# Patient Record
Sex: MALE | Race: WHITE
[De-identification: names, ages, dates, MRNs, and addresses within clinical notes are randomized per-mention and may not be internally consistent; named-entity substitution may affect disease eponyms.]

---

## 2018-01-13 ENCOUNTER — HOSPITAL ENCOUNTER (EMERGENCY)
Dept: HOSPITAL 17 - NEPD | Age: 28
Discharge: HOME | End: 2018-01-13
Payer: SELF-PAY

## 2018-01-13 VITALS
RESPIRATION RATE: 18 BRPM | HEART RATE: 85 BPM | DIASTOLIC BLOOD PRESSURE: 65 MMHG | OXYGEN SATURATION: 98 % | SYSTOLIC BLOOD PRESSURE: 132 MMHG

## 2018-01-13 VITALS — DIASTOLIC BLOOD PRESSURE: 65 MMHG | SYSTOLIC BLOOD PRESSURE: 130 MMHG

## 2018-01-13 VITALS — TEMPERATURE: 98.8 F

## 2018-01-13 DIAGNOSIS — F17.200: ICD-10-CM

## 2018-01-13 DIAGNOSIS — J45.901: ICD-10-CM

## 2018-01-13 DIAGNOSIS — Z59.0: ICD-10-CM

## 2018-01-13 DIAGNOSIS — F31.9: ICD-10-CM

## 2018-01-13 DIAGNOSIS — J06.9: Primary | ICD-10-CM

## 2018-01-13 DIAGNOSIS — F90.9: ICD-10-CM

## 2018-01-13 PROCEDURE — 87804 INFLUENZA ASSAY W/OPTIC: CPT

## 2018-01-13 PROCEDURE — 99284 EMERGENCY DEPT VISIT MOD MDM: CPT

## 2018-01-13 PROCEDURE — 94664 DEMO&/EVAL PT USE INHALER: CPT

## 2018-01-13 SDOH — ECONOMIC STABILITY - HOUSING INSECURITY: HOMELESSNESS: Z59.0

## 2018-01-13 NOTE — PD
HPI


Chief Complaint:  Cold / Flu Symptoms


Time Seen by Provider:  08:19


Travel History


International Travel<30 days:  No


Contact w/Intl Traveler<30days:  No


Traveled to known affect area:  No





History of Present Illness


HPI


27-year-old male, with history of asthma, presents to the emergency Department 

with complaint of cough, nasal congestion, chest congestion, chest tightness, 

shortness of breath with worsening of symptoms 1 week.  Patient is homeless 

and was brought in via EMS.  Was seen at [facility] yesterday and said he 

had a chest x-ray done which he was told was okay.  He said he was discharged 

with Tessalon pearls.  Unknown fevers.  Denies abdominal pain, vomiting.  

Denies sore throat, ear pain.  Reports wheezing.  Denies chest pain.  Has been 

taking Delsym for symptom management.  Symptoms are mild in severity.  Symptoms 

are aggravated with walking.  No known relieving factors.  No known allergies.  

No primary care provider.  History of asthma.  Has not medical complaints.  No 

other modifying factors or associated signs and symptoms.





PFSH


Past Medical History


ADHD:  Yes


Asthma:  Yes


Bipolar Disorder:  Yes


Diminished Hearing:  No


Immunizations Current:  Yes


Influenza Vaccination:  No





Past Surgical History


Surgical History:  No Previous Surgery





Social History


Alcohol Use:  Yes (OCCASIONALLY)


Tobacco Use:  Yes (1 ppd)


Substance Use:  Yes ( herion iv, occ. cocaine; DENIES ON THIS VISIT)





Allergies-Medications


(Allergen,Severity, Reaction):  


Coded Allergies:  


     No Known Allergies (Verified  Adverse Reaction, Unknown, 1/13/18)


Reported Meds & Prescriptions





Reported Meds & Active Scripts


Active


Azithromycin 500 Mg Tab 500 Mg PO DAILY


Deltasone (Prednisone) 20 Mg Tab 20 Mg PO BID








Review of Systems


Except as stated in HPI:  all other systems reviewed are Neg





Physical Exam


Narrative


GENERAL: Well-nourished, well-developed  male patient, in no acute 

distress; afebrile, nontoxic-appearing


SKIN: Warm and dry.


HEAD: Atraumatic. Normocephalic. 


EYES: Pupils equal and round. No scleral icterus. No injection or drainage. 


ENT: Mucosa pink and moist. No erythema or exudates. No uvular edema. No uvular

, palatal, or tonsillar deviation.  Airway patent.  Nares without nasal blood, 

purulent drainage or septal hematoma.


EARS: Bilateral pinnae and external canals appear within normal limits. 

Bilateral tympanic membranes without erythema, dullness or perforation.


NECK: Trachea midline.  No lymphadenopathy. 


CARDIOVASCULAR: Regular rate and rhythm.  No murmur appreciated.


RESPIRATORY: No accessory muscle use.  Lungs with Wheezing throughout to 

auscultation. Breath sounds equal bilaterally.  No retractions or tachypnea.  

No Audible wheezing noted.  


GASTROINTESTINAL: Flat.


MUSCULOSKELETAL: No obvious deformities. No clubbing.  No cyanosis.  No edema. 


NEUROLOGICAL: Awake and alert.  Oriented 3.  No obvious cranial nerve 

deficits.  Motor grossly within normal limits. Normal speech. Moves all 

extremities.  5/5 strength to all extremities.


PSYCHIATRIC: Appropriate mood and affect; insight and judgment normal.





Data


Data


Last Documented VS





Vital Signs








  Date Time  Temp Pulse Resp B/P (MAP) Pulse Ox O2 Delivery O2 Flow Rate FiO2


 


1/13/18 08:23  85 18 132/65 (87) 98   


 


1/13/18 06:48 98.8       








Orders





 Orders


Influenzae A/B Antigen (1/13/18 07:03)


Prednisone (Deltasone) (1/13/18 08:30)


Albuterol-Ipratropium Neb (Duoneb Neb) (1/13/18 08:30)


Albuterol Hfa Inh (Proair Hfa Inh) (1/13/18 08:30)








MDM


Medical Decision Making


Medical Screen Exam Complete:  Yes


Emergency Medical Condition:  Yes


Medical Record Reviewed:  Yes


Differential Diagnosis


Acute bronchitis, upper respiratory infection, influenza, pneumonia


Narrative Course


27-year-old male physical examination consistent with asthma exacerbation and 

upper respiratory infection.  Patient has been sick for approximately one week.

  Lungs with wheezing throughout.  Patient is in no acute distress without 

retractions or tachypnea.  Oxygen saturation is 98% on room air.  He states he 

was seen at [facility] yesterday and had a chest x-ray which was normal, 

per the patient.  DuoNeb and Deltasone ordered.  


0847:  On reexamination lungs are clear and equal throughout.  The patient 

reports improvement in symptoms.  He denies chest tightness or shortness of 

breath.  Patient is homeless.  I will provide the patient with an albuterol 

inhaler for home.  Prescribed azithromycin, Deltasone for home.  Instructed 

patient to follow up with primary care provider.  Patient verbalizes 

understanding and agreement with treatment plan.  Patient is medically cleared 

and stable for discharge.  Discussed reasons to return to the emergency 

department.  Patient agrees with treatment plan.  The patients vital signs are 

stable and the patient is stable for outpatient follow-up and treatment.  

Patient discharged home, stable and in no acute distress.





Diagnosis





 Primary Impression:  


 URI (upper respiratory infection)


 Qualified Codes:  J06.9 - Acute upper respiratory infection, unspecified


 Additional Impression:  


 Asthma exacerbation


 Qualified Codes:  J45.901 - Unspecified asthma with (acute) exacerbation


Referrals:  


[facility]





Primary Care Physician


Patient Instructions:  Acute Bronchitis (ED), Asthma (ED), General Instructions

, Upper Respiratory Infection (ED)





***Additional Instructions:  


Use Albuterol inhaler as prescribed


Take oral steroids as prescribed and complete full course


Use Tessalon Perles as prescribed to decrease coughing spasms


Over-the-counter decongestants or antihistamines as directed and as needed for 

symptom management


Your cough can last 4-6 weeks


Drink plenty of fluids to prevent dehydration


Use hot air humidifier to decrease cough exacerbation


Turn off ceiling fans and sleep with head of bed elevated


Avoid triggers such as second hand smoke, dust, known allergens


Follow-up with your primary care provider


Return to the emergency department immediately with worsening of symptoms


***Med/Other Pt SpecificInfo:  Prescription(s) given


Scripts


Azithromycin (Azithromycin) 500 Mg Tab


500 MG PO DAILY for Infection, #5 TAB 0 Refills


   Prov: Preeti Tidwell         1/13/18 


Prednisone (Deltasone) 20 Mg Tab


20 MG PO BID, #60 TAB 0 Refills


   Prov: Preeti Tidwell         1/13/18


Disposition:  01 DISCHARGE HOME


Condition:  Stable











Preeti Tidwell Jan 13, 2018 08:30

## 2018-01-25 ENCOUNTER — HOSPITAL ENCOUNTER (INPATIENT)
Dept: HOSPITAL 17 - NEPD | Age: 28
LOS: 4 days | Discharge: HOME | DRG: 872 | End: 2018-01-29
Attending: HOSPITALIST | Admitting: HOSPITALIST
Payer: SELF-PAY

## 2018-01-25 VITALS — HEIGHT: 72 IN | BODY MASS INDEX: 25.68 KG/M2 | WEIGHT: 189.6 LBS

## 2018-01-25 VITALS
OXYGEN SATURATION: 99 % | TEMPERATURE: 98.4 F | DIASTOLIC BLOOD PRESSURE: 84 MMHG | RESPIRATION RATE: 24 BRPM | SYSTOLIC BLOOD PRESSURE: 162 MMHG | HEART RATE: 92 BPM

## 2018-01-25 VITALS — RESPIRATION RATE: 20 BRPM | OXYGEN SATURATION: 100 %

## 2018-01-25 DIAGNOSIS — F11.10: ICD-10-CM

## 2018-01-25 DIAGNOSIS — M54.5: ICD-10-CM

## 2018-01-25 DIAGNOSIS — E87.6: ICD-10-CM

## 2018-01-25 DIAGNOSIS — F17.210: ICD-10-CM

## 2018-01-25 DIAGNOSIS — B19.20: ICD-10-CM

## 2018-01-25 DIAGNOSIS — F31.9: ICD-10-CM

## 2018-01-25 DIAGNOSIS — R19.7: ICD-10-CM

## 2018-01-25 DIAGNOSIS — R74.0: ICD-10-CM

## 2018-01-25 DIAGNOSIS — D64.9: ICD-10-CM

## 2018-01-25 DIAGNOSIS — J45.909: ICD-10-CM

## 2018-01-25 DIAGNOSIS — A41.9: Primary | ICD-10-CM

## 2018-01-25 LAB
ALBUMIN SERPL-MCNC: 3.2 GM/DL (ref 3.4–5)
ALP SERPL-CCNC: 133 U/L (ref 45–117)
ALT SERPL-CCNC: 163 U/L (ref 12–78)
AST SERPL-CCNC: 277 U/L (ref 15–37)
BASOPHILS # BLD AUTO: 0 TH/MM3 (ref 0–0.2)
BASOPHILS NFR BLD: 0.2 % (ref 0–2)
BILIRUB SERPL-MCNC: 0.7 MG/DL (ref 0.2–1)
BUN SERPL-MCNC: 12 MG/DL (ref 7–18)
CALCIUM SERPL-MCNC: 8.6 MG/DL (ref 8.5–10.1)
CHLORIDE SERPL-SCNC: 101 MEQ/L (ref 98–107)
CREAT SERPL-MCNC: 0.9 MG/DL (ref 0.6–1.3)
EOSINOPHIL # BLD: 0.1 TH/MM3 (ref 0–0.4)
EOSINOPHIL NFR BLD: 0.3 % (ref 0–4)
ERYTHROCYTE [DISTWIDTH] IN BLOOD BY AUTOMATED COUNT: 13.3 % (ref 11.6–17.2)
GFR SERPLBLD BASED ON 1.73 SQ M-ARVRAT: 102 ML/MIN (ref 89–?)
GLUCOSE SERPL-MCNC: 93 MG/DL (ref 74–106)
HCO3 BLD-SCNC: 26.1 MEQ/L (ref 21–32)
HCT VFR BLD CALC: 40 % (ref 39–51)
HGB BLD-MCNC: 13.6 GM/DL (ref 13–17)
LACTIC ACID SEPSIS PROTOCOL: 2.2 MMOL/L (ref 0.4–2)
LIPASE: 78 U/L (ref 73–393)
LYMPHOCYTES # BLD AUTO: 0.5 TH/MM3 (ref 1–4.8)
LYMPHOCYTES NFR BLD AUTO: 2.3 % (ref 9–44)
MCH RBC QN AUTO: 28.2 PG (ref 27–34)
MCHC RBC AUTO-ENTMCNC: 33.9 % (ref 32–36)
MCV RBC AUTO: 83.1 FL (ref 80–100)
MONOCYTE #: 0.1 TH/MM3 (ref 0–0.9)
MONOCYTES NFR BLD: 0.5 % (ref 0–8)
NEUTROPHILS # BLD AUTO: 20.5 TH/MM3 (ref 1.8–7.7)
NEUTROPHILS NFR BLD AUTO: 96.7 % (ref 16–70)
PLATELET # BLD: 261 TH/MM3 (ref 150–450)
PMV BLD AUTO: 8.4 FL (ref 7–11)
PROT SERPL-MCNC: 7.3 GM/DL (ref 6.4–8.2)
RBC # BLD AUTO: 4.82 MIL/MM3 (ref 4.5–5.9)
SODIUM SERPL-SCNC: 136 MEQ/L (ref 136–145)
WBC # BLD AUTO: 21.2 TH/MM3 (ref 4–11)

## 2018-01-25 PROCEDURE — 80074 ACUTE HEPATITIS PANEL: CPT

## 2018-01-25 PROCEDURE — 83520 IMMUNOASSAY QUANT NOS NONAB: CPT

## 2018-01-25 PROCEDURE — 83735 ASSAY OF MAGNESIUM: CPT

## 2018-01-25 PROCEDURE — 72158 MRI LUMBAR SPINE W/O & W/DYE: CPT

## 2018-01-25 PROCEDURE — 82550 ASSAY OF CK (CPK): CPT

## 2018-01-25 PROCEDURE — 71275 CT ANGIOGRAPHY CHEST: CPT

## 2018-01-25 PROCEDURE — 83550 IRON BINDING TEST: CPT

## 2018-01-25 PROCEDURE — 74177 CT ABD & PELVIS W/CONTRAST: CPT

## 2018-01-25 PROCEDURE — 83540 ASSAY OF IRON: CPT

## 2018-01-25 PROCEDURE — 80048 BASIC METABOLIC PNL TOTAL CA: CPT

## 2018-01-25 PROCEDURE — 86403 PARTICLE AGGLUT ANTBDY SCRN: CPT

## 2018-01-25 PROCEDURE — 96361 HYDRATE IV INFUSION ADD-ON: CPT

## 2018-01-25 PROCEDURE — 83690 ASSAY OF LIPASE: CPT

## 2018-01-25 PROCEDURE — 76705 ECHO EXAM OF ABDOMEN: CPT

## 2018-01-25 PROCEDURE — 93005 ELECTROCARDIOGRAM TRACING: CPT

## 2018-01-25 PROCEDURE — 87506 IADNA-DNA/RNA PROBE TQ 6-11: CPT

## 2018-01-25 PROCEDURE — A9537 TC99M MEBROFENIN: HCPCS

## 2018-01-25 PROCEDURE — 82103 ALPHA-1-ANTITRYPSIN TOTAL: CPT

## 2018-01-25 PROCEDURE — 86803 HEPATITIS C AB TEST: CPT

## 2018-01-25 PROCEDURE — 85610 PROTHROMBIN TIME: CPT

## 2018-01-25 PROCEDURE — 85027 COMPLETE CBC AUTOMATED: CPT

## 2018-01-25 PROCEDURE — 85025 COMPLETE CBC W/AUTO DIFF WBC: CPT

## 2018-01-25 PROCEDURE — 87185 SC STD ENZYME DETCJ PER NZM: CPT

## 2018-01-25 PROCEDURE — 87902 NFCT AGT GNTYP ALYS HEP C: CPT

## 2018-01-25 PROCEDURE — 93306 TTE W/DOPPLER COMPLETE: CPT

## 2018-01-25 PROCEDURE — 82390 ASSAY OF CERULOPLASMIN: CPT

## 2018-01-25 PROCEDURE — 87205 SMEAR GRAM STAIN: CPT

## 2018-01-25 PROCEDURE — 86038 ANTINUCLEAR ANTIBODIES: CPT

## 2018-01-25 PROCEDURE — 81001 URINALYSIS AUTO W/SCOPE: CPT

## 2018-01-25 PROCEDURE — 80053 COMPREHEN METABOLIC PANEL: CPT

## 2018-01-25 PROCEDURE — 87493 C DIFF AMPLIFIED PROBE: CPT

## 2018-01-25 PROCEDURE — 87522 HEPATITIS C REVRS TRNSCRPJ: CPT

## 2018-01-25 PROCEDURE — 83880 ASSAY OF NATRIURETIC PEPTIDE: CPT

## 2018-01-25 PROCEDURE — 96375 TX/PRO/DX INJ NEW DRUG ADDON: CPT

## 2018-01-25 PROCEDURE — 87804 INFLUENZA ASSAY W/OPTIC: CPT

## 2018-01-25 PROCEDURE — 85379 FIBRIN DEGRADATION QUANT: CPT

## 2018-01-25 PROCEDURE — 85007 BL SMEAR W/DIFF WBC COUNT: CPT

## 2018-01-25 PROCEDURE — 80076 HEPATIC FUNCTION PANEL: CPT

## 2018-01-25 PROCEDURE — 96366 THER/PROPH/DIAG IV INF ADDON: CPT

## 2018-01-25 PROCEDURE — 87328 CRYPTOSPORIDIUM AG IA: CPT

## 2018-01-25 PROCEDURE — 83605 ASSAY OF LACTIC ACID: CPT

## 2018-01-25 PROCEDURE — 82728 ASSAY OF FERRITIN: CPT

## 2018-01-25 PROCEDURE — A9579 GAD-BASE MR CONTRAST NOS,1ML: HCPCS

## 2018-01-25 PROCEDURE — 96365 THER/PROPH/DIAG IV INF INIT: CPT

## 2018-01-25 PROCEDURE — 87040 BLOOD CULTURE FOR BACTERIA: CPT

## 2018-01-25 PROCEDURE — 78227 HEPATOBIL SYST IMAGE W/DRUG: CPT

## 2018-01-25 PROCEDURE — 82105 ALPHA-FETOPROTEIN SERUM: CPT

## 2018-01-25 PROCEDURE — 86255 FLUORESCENT ANTIBODY SCREEN: CPT

## 2018-01-25 PROCEDURE — 87329 GIARDIA AG IA: CPT

## 2018-01-25 PROCEDURE — 80202 ASSAY OF VANCOMYCIN: CPT

## 2018-01-25 RX ADMIN — PHENYTOIN SODIUM SCH MLS/HR: 50 INJECTION INTRAMUSCULAR; INTRAVENOUS at 23:40

## 2018-01-25 NOTE — RADRPT
EXAM DATE/TIME:  01/25/2018 21:34 

 

[facility] COMPARISON:     

No previous studies available for comparison.

 

 

INDICATIONS :     

Bilateral flank pain. 

                      

 

IV CONTRAST:     

100 cc Omnipaque 350 (iohexol) IV 

 

 

ORAL CONTRAST:      

No oral contrast ingested.

                      

 

RADIATION DOSE:     

6.64 CTDIvol (mGy) 

 

 

MEDICAL HISTORY :       

Substance abuse. 

 

SURGICAL HISTORY :      

None. 

 

ENCOUNTER:      

Initial

 

ACUITY:      

1 day

 

PAIN SCALE:      

7/10

 

LOCATION:       

Bilateral flank 

 

TECHNIQUE:     

Volumetric scanning of the abdomen and pelvis was performed.  Using automated exposure control and ad
justment of the mA and/or kV according to patient size, radiation dose was kept as low as reasonably 
achievable to obtain optimal diagnostic quality images.  DICOM format image data is available electro
nically for review and comparison.  

 

FINDINGS:     

 

LOWER LUNGS:     

Mild patchy infiltrate in the visualized right middle and lower lobes

 

LIVER:     

Enlarged at 24 cm craniocaudal. No focal hepatic lesion.  There is no dilation of the biliary tree. S
ome mild periportal edema is present, nonspecific but often seen in the setting of vigorous intraveno
us hydration; underlying hepatocellular disease also in the differential. There is small pericholecys
tic fluid, series 2 image 35, possibly on the same basis. I don't clearly see gallbladder wall thicke
sonya or inflammatory change. No perceptible stones.

 

SPLEEN:     

Upper limits of normal size, 9.2 x 14.2 x 12.1 cm

 

PANCREAS:     

Within normal limits.

 

KIDNEYS:     

Normal in size and shape.  There is no mass, stone or hydronephrosis.

 

ADRENAL GLANDS:     

Within normal limits.

 

VASCULAR:     

There is no aortic aneurysm.

 

BOWEL/MESENTERY:     

The stomach, small bowel, and colon demonstrate no acute abnormality.  There is no free intraperitone
al air or fluid. Normal appendix.

 

ABDOMINAL WALL:     

Small amount of fat herniated at the umbilicus. No bowel herniation.

 

RETROPERITONEUM:     

There is no lymphadenopathy. 

 

BLADDER:     

No wall thickening or mass. 

 

REPRODUCTIVE:     

Within normal limits.

 

INGUINAL:     

There is no lymphadenopathy or hernia. 

 

MUSCULOSKELETAL:     

Within normal limits for patient age. 

 

CONCLUSION:     

1. No evidence of bilateral Friday as are other acute renal abnormality.

2. Hepatomegaly with periportal edema and also trace fluid around the gallbladder. Please see above. 
No perceptible inflammatory changes.

3. Upper limits of normal to mildly enlarged spleen, nonspecific.

4. Apparent patchy pneumonia in the visualized right base.

5. Small fat containing umbilical hernia.

 

 

 

 Nahid Cueva MD on January 25, 2018 at 21:48           

Board Certified Radiologist.

 This report was verified electronically.

## 2018-01-25 NOTE — PD
HPI


Chief Complaint:  Headache


Time Seen by Provider:  20:01


Travel History


International Travel<30 days:  No


Contact w/Intl Traveler<30days:  No


Traveled to known affect area:  No





History of Present Illness


HPI


The patient is 27 years old and arrives to the ER with a complaint low back 

pain.  Duration about 2 hours.  He is currently an IV drug abuser, heroin.  He 

states the pain is severe.  He believes his "kidneys are exploding."  He denies 

fever.  The pain is constant.  It's worse with palpation/percussion of the low 

back.





PFSH


Past Medical History


ADHD:  Yes


Asthma:  Yes


Bipolar Disorder:  Yes


Diminished Hearing:  No


Immunizations Current:  Yes





Past Surgical History


Surgical History:  No Previous Surgery





Social History


Alcohol Use:  Yes (OCCASIONALLY)


Tobacco Use:  Yes (1 ppd)


Substance Use:  Yes ( herion iv, occ. cocaine; DENIES ON THIS VISIT)





Allergies-Medications


(Allergen,Severity, Reaction):  


Coded Allergies:  


     No Known Allergies (Verified  Adverse Reaction, Unknown, 1/25/18)


Reported Meds & Prescriptions





Reported Meds & Active Scripts


Active


Azithromycin 500 Mg Tab 500 Mg PO DAILY


Deltasone (Prednisone) 20 Mg Tab 20 Mg PO BID








Review of Systems


Except as stated in HPI:  all other systems reviewed are Neg





Physical Exam


Narrative


GENERAL: 27-year-old male well-nourished well-developed mild to moderate 

distress


SKIN: Warm and dry.


HEAD: Atraumatic. Normocephalic. 


EYES: Pupils equal and round. No scleral icterus. No injection or drainage. 


ENT: No nasal bleeding or discharge.  Mucous membranes pink and moist.


NECK: Trachea midline. No JVD. 


CARDIOVASCULAR: Regular rate and rhythm.  


RESPIRATORY: No accessory muscle use. Clear to auscultation. Breath sounds 

equal bilaterally. 


GASTROINTESTINAL: Soft.  Tenderness to percussion lower back.  No abdominal TTP.


MUSCULOSKELETAL: Extremities without clubbing, cyanosis, or edema. No obvious 

deformities. 


NEUROLOGICAL: Awake and alert. No obvious cranial nerve deficits.  Motor 

grossly within normal limits. Five out of 5 muscle strength in the arms and 

legs.  Normal speech.


PSYCHIATRIC: Appropriate mood and affect; insight and judgment normal.





Data


Data


Last Documented VS





Vital Signs








  Date Time  Temp Pulse Resp B/P (MAP) Pulse Ox O2 Delivery O2 Flow Rate FiO2


 


1/25/18 20:09   20  100 Room Air  


 


1/25/18 18:14 98.4 92      





VS reviewed


Orders





 Orders


Complete Blood Count With Diff (1/25/18 20:01)


Comprehensive Metabolic Panel (1/25/18 20:01)


Lipase (1/25/18 20:01)


Urinalysis - C+S If Indicated (1/25/18 20:01)


Ct Abd/Pel W Iv Contrast(Rout) (1/25/18 20:01)


Iv Access Insert/Monitor (1/25/18 20:01)


Ecg Monitoring (1/25/18 20:01)


Oximetry (1/25/18 20:01)


Sodium Chlor 0.9% 1000 Ml Inj (Ns 1000 M (1/25/18 20:01)


Sodium Chloride 0.9% Flush (Ns Flush) (1/25/18 20:15)


Electrocardiogram (1/25/18 20:01)


Ketorolac Inj (Toradol Inj) (1/25/18 20:15)


Iohexol 350 Inj (Omnipaque 350 Inj) (1/25/18 21:36)


Piperacil-Tazo 4.5 Gm Premix (Zosyn 4.5 (1/25/18 22:15)


Vancomycin Inj (Vancomycin Inj) (1/25/18 22:15)


Blood Culture (1/25/18 22:04)


Admit Order (Ed Use Only) (1/25/18 )


Cardiac Monitor / Telemetry PAULO.Q8H (1/25/18 22:47)


Vital Signs (Adult) Q4H (1/25/18 22:47)


Diet Npo (1/26/18 Breakfast)


Activity Bed Rest (1/25/18 22:47)


Lactic Acid Sepsis Protocol (1/25/18 22:47)


Sodium Chlor 0.9% 1000 Ml Inj (Ns 1000 M (1/25/18 22:47)


Sodium Chlor 0.9% 1000 Ml Inj (Ns 1000 M (1/25/18 22:47)





Labs





Laboratory Tests








Test


  1/25/18


20:20


 


White Blood Count 21.2 TH/MM3 


 


Red Blood Count 4.82 MIL/MM3 


 


Hemoglobin 13.6 GM/DL 


 


Hematocrit 40.0 % 


 


Mean Corpuscular Volume 83.1 FL 


 


Mean Corpuscular Hemoglobin 28.2 PG 


 


Mean Corpuscular Hemoglobin


Concent 33.9 % 


 


 


Red Cell Distribution Width 13.3 % 


 


Platelet Count 261 TH/MM3 


 


Mean Platelet Volume 8.4 FL 


 


Neutrophils (%) (Auto) 96.7 % 


 


Lymphocytes (%) (Auto) 2.3 % 


 


Monocytes (%) (Auto) 0.5 % 


 


Eosinophils (%) (Auto) 0.3 % 


 


Basophils (%) (Auto) 0.2 % 


 


Neutrophils # (Auto) 20.5 TH/MM3 


 


Lymphocytes # (Auto) 0.5 TH/MM3 


 


Monocytes # (Auto) 0.1 TH/MM3 


 


Eosinophils # (Auto) 0.1 TH/MM3 


 


Basophils # (Auto) 0.0 TH/MM3 


 


CBC Comment DIFF FINAL 


 


Differential Comment  


 


Blood Urea Nitrogen 12 MG/DL 


 


Creatinine 0.90 MG/DL 


 


Random Glucose 93 MG/DL 


 


Total Protein 7.3 GM/DL 


 


Albumin 3.2 GM/DL 


 


Calcium Level 8.6 MG/DL 


 


Alkaline Phosphatase 133 U/L 


 


Aspartate Amino Transf


(AST/SGOT) 277 U/L 


 


 


Alanine Aminotransferase


(ALT/SGPT) 163 U/L 


 


 


Total Bilirubin 0.7 MG/DL 


 


Sodium Level 136 MEQ/L 


 


Potassium Level 3.3 MEQ/L 


 


Chloride Level 101 MEQ/L 


 


Carbon Dioxide Level 26.1 MEQ/L 


 


Anion Gap 9 MEQ/L 


 


Estimat Glomerular Filtration


Rate 102 ML/MIN 


 


 


Lipase 78 U/L 











[facility]


Medical Decision Making


Medical Screen Exam Complete:  Yes


Emergency Medical Condition:  Yes


Medical Record Reviewed:  Yes


Differential Diagnosis


Endocarditis, septic emboli, pyelonephritis


Narrative Course


CBC & BMP Diagram


1/25/18 20:20








Total Protein 7.3, Albumin 3.2 L, Calcium Level 8.6, Alkaline Phosphatase 133 H

, Aspartate Amino Transf (AST/SGOT) 277 H, Alanine Aminotransferase (ALT/SGPT) 

163 H, Total Bilirubin 0.7





Lipase is normal





Last Impressions








Abdomen/Pelvis CT 1/25/18 2001 Signed





Impressions: 





 Service Date/Time:  Thursday, January 25, 2018 21:34 - CONCLUSION:  1. No 





 evidence of bilateral Friday as are other acute renal abnormality. 2. 





 Hepatomegaly with periportal edema and also trace fluid around the 

gallbladder. 





 Please see above. No perceptible inflammatory changes. 3. Upper limits of 

normal 





 to mildly enlarged spleen, nonspecific. 4. Apparent patchy pneumonia in the 





 visualized right base. 5. Small fat containing umbilical hernia.     MD Bill Clinton / Eleuterio started


Blood cultures obtained


Concern for endocarditis and her septic emboli


Discussed with Dr. Blancas for [facility]





Sepsis Criteria


SIRS Criteria (2 or more):  Heart rate over 90, WBC > 41496, < 4000 or > 10% 

bands


Sepsis Criteria (SIRS+source):  Infect source susp/known





Diagnosis





 Primary Impression:  


 Septic embolism





Admitting Information


Admitting Physician Requests:  Admit











Jona Perla MD Jan 25, 2018 23:06

## 2018-01-26 VITALS
OXYGEN SATURATION: 100 % | HEART RATE: 61 BPM | RESPIRATION RATE: 17 BRPM | TEMPERATURE: 98.4 F | DIASTOLIC BLOOD PRESSURE: 67 MMHG | SYSTOLIC BLOOD PRESSURE: 130 MMHG

## 2018-01-26 VITALS
TEMPERATURE: 98.4 F | OXYGEN SATURATION: 99 % | DIASTOLIC BLOOD PRESSURE: 67 MMHG | RESPIRATION RATE: 16 BRPM | HEART RATE: 72 BPM | SYSTOLIC BLOOD PRESSURE: 126 MMHG

## 2018-01-26 VITALS
HEART RATE: 73 BPM | RESPIRATION RATE: 18 BRPM | DIASTOLIC BLOOD PRESSURE: 72 MMHG | TEMPERATURE: 96.3 F | OXYGEN SATURATION: 100 % | SYSTOLIC BLOOD PRESSURE: 142 MMHG

## 2018-01-26 VITALS
TEMPERATURE: 98 F | RESPIRATION RATE: 15 BRPM | OXYGEN SATURATION: 98 % | SYSTOLIC BLOOD PRESSURE: 120 MMHG | HEART RATE: 65 BPM | DIASTOLIC BLOOD PRESSURE: 64 MMHG

## 2018-01-26 VITALS
RESPIRATION RATE: 16 BRPM | HEART RATE: 68 BPM | DIASTOLIC BLOOD PRESSURE: 73 MMHG | OXYGEN SATURATION: 98 % | SYSTOLIC BLOOD PRESSURE: 135 MMHG

## 2018-01-26 VITALS
OXYGEN SATURATION: 97 % | TEMPERATURE: 97.6 F | SYSTOLIC BLOOD PRESSURE: 125 MMHG | DIASTOLIC BLOOD PRESSURE: 72 MMHG | RESPIRATION RATE: 16 BRPM | HEART RATE: 67 BPM

## 2018-01-26 VITALS
OXYGEN SATURATION: 99 % | HEART RATE: 77 BPM | RESPIRATION RATE: 18 BRPM | SYSTOLIC BLOOD PRESSURE: 139 MMHG | DIASTOLIC BLOOD PRESSURE: 70 MMHG | TEMPERATURE: 97.7 F

## 2018-01-26 LAB
% SATURATION IRON PROFILE: 18.9 % (ref 20–50)
ALBUMIN SERPL-MCNC: 2.7 GM/DL (ref 3.4–5)
ALP SERPL-CCNC: 100 U/L (ref 45–117)
ALT SERPL-CCNC: 150 U/L (ref 12–78)
AST SERPL-CCNC: 120 U/L (ref 15–37)
BACTERIA #/AREA URNS HPF: (no result) /HPF
BASOPHILS # BLD AUTO: 0 TH/MM3 (ref 0–0.2)
BASOPHILS NFR BLD: 0.1 % (ref 0–2)
BILIRUB SERPL-MCNC: 0.6 MG/DL (ref 0.2–1)
BUN SERPL-MCNC: 9 MG/DL (ref 7–18)
CALCIUM SERPL-MCNC: 7.7 MG/DL (ref 8.5–10.1)
CHLORIDE SERPL-SCNC: 108 MEQ/L (ref 98–107)
COLOR UR: YELLOW
CREAT SERPL-MCNC: 0.73 MG/DL (ref 0.6–1.3)
D-DIMER: 11.37 MG/L FEU (ref 0–0.5)
EOSINOPHIL # BLD: 0.1 TH/MM3 (ref 0–0.4)
EOSINOPHIL NFR BLD: 0.4 % (ref 0–4)
ERYTHROCYTE [DISTWIDTH] IN BLOOD BY AUTOMATED COUNT: 13.5 % (ref 11.6–17.2)
FERRITIN SERPL-MCNC: 169 NG/ML (ref 26–388)
GFR SERPLBLD BASED ON 1.73 SQ M-ARVRAT: 130 ML/MIN (ref 89–?)
GLUCOSE SERPL-MCNC: 73 MG/DL (ref 74–106)
GLUCOSE UR STRIP-MCNC: (no result) MG/DL
HCO3 BLD-SCNC: 25.2 MEQ/L (ref 21–32)
HCT VFR BLD CALC: 37.5 % (ref 39–51)
HEPATITIS A AB IGM: NEGATIVE
HEPATITIS B CORE AB IGM: NEGATIVE
HEPATITIS B SURFACE ANTIGEN: NEGATIVE
HEPATITIS C AB IGG: REACTIVE
HGB BLD-MCNC: 12.7 GM/DL (ref 13–17)
HGB UR QL STRIP: (no result)
INR PPP: 1.2 RATIO
IRON (FE): 52 MCG/DL (ref 65–175)
KETONES UR STRIP-MCNC: (no result) MG/DL
LYMPHOCYTES # BLD AUTO: 1.9 TH/MM3 (ref 1–4.8)
LYMPHOCYTES NFR BLD AUTO: 5.8 % (ref 9–44)
LYMPHOCYTES: 5 % (ref 9–44)
MCH RBC QN AUTO: 28.6 PG (ref 27–34)
MCHC RBC AUTO-ENTMCNC: 33.9 % (ref 32–36)
MCV RBC AUTO: 84.3 FL (ref 80–100)
MONOCYTE #: 0.8 TH/MM3 (ref 0–0.9)
MONOCYTES NFR BLD: 2.7 % (ref 0–8)
MONOCYTES: 1 % (ref 0–8)
NEUTROPHILS # BLD AUTO: 28.9 TH/MM3 (ref 1.8–7.7)
NEUTROPHILS NFR BLD AUTO: 91 % (ref 16–70)
NEUTS BAND # BLD MANUAL: 29.8 TH/MM3 (ref 1.8–7.7)
NEUTS BAND NFR BLD: 33 % (ref 0–6)
NEUTS SEG NFR BLD MANUAL: 61 % (ref 16–70)
NITRITE UR QL STRIP: (no result)
PLATELET # BLD: 235 TH/MM3 (ref 150–450)
PMV BLD AUTO: 8.9 FL (ref 7–11)
PROT SERPL-MCNC: 6.5 GM/DL (ref 6.4–8.2)
PROTHROMBIN TIME: 12 SEC (ref 9.8–11.6)
RBC # BLD AUTO: 4.45 MIL/MM3 (ref 4.5–5.9)
SODIUM SERPL-SCNC: 141 MEQ/L (ref 136–145)
SP GR UR STRIP: 1.01 (ref 1–1.03)
SQUAMOUS #/AREA URNS HPF: <1 /HPF (ref 0–5)
TIBC SERPL-MCNC: 276 MCG/DL (ref 250–450)
URINE LEUKOCYTE ESTERASE: (no result)
WBC # BLD AUTO: 31.7 TH/MM3 (ref 4–11)
WBC TOXIC VACUOLES BLD QL SMEAR: PRESENT

## 2018-01-26 RX ADMIN — ACETAMINOPHEN PRN MG: 325 TABLET ORAL at 09:25

## 2018-01-26 RX ADMIN — HYDROCODONE BITARTRATE AND ACETAMINOPHEN PRN TAB: 7.5; 325 TABLET ORAL at 21:13

## 2018-01-26 RX ADMIN — PHENYTOIN SODIUM SCH MLS/HR: 50 INJECTION INTRAMUSCULAR; INTRAVENOUS at 13:48

## 2018-01-26 RX ADMIN — Medication SCH ML: at 07:50

## 2018-01-26 RX ADMIN — TAZOBACTAM SODIUM AND PIPERACILLIN SODIUM SCH MLS/HR: 500; 4 INJECTION, SOLUTION INTRAVENOUS at 10:54

## 2018-01-26 RX ADMIN — SODIUM CHLORIDE SCH MLS/HR: 900 INJECTION INTRAVENOUS at 15:14

## 2018-01-26 RX ADMIN — PHENYTOIN SODIUM SCH MLS/HR: 50 INJECTION INTRAMUSCULAR; INTRAVENOUS at 23:55

## 2018-01-26 RX ADMIN — TAZOBACTAM SODIUM AND PIPERACILLIN SODIUM SCH MLS/HR: 500; 4 INJECTION, SOLUTION INTRAVENOUS at 06:13

## 2018-01-26 RX ADMIN — SODIUM CHLORIDE SCH MLS/HR: 900 INJECTION INTRAVENOUS at 23:54

## 2018-01-26 RX ADMIN — TAZOBACTAM SODIUM AND PIPERACILLIN SODIUM SCH MLS/HR: 500; 4 INJECTION, SOLUTION INTRAVENOUS at 17:00

## 2018-01-26 RX ADMIN — TAZOBACTAM SODIUM AND PIPERACILLIN SODIUM SCH MLS/HR: 500; 4 INJECTION, SOLUTION INTRAVENOUS at 23:18

## 2018-01-26 RX ADMIN — HYDROCODONE BITARTRATE AND ACETAMINOPHEN PRN TAB: 7.5; 325 TABLET ORAL at 15:15

## 2018-01-26 RX ADMIN — PHENYTOIN SODIUM SCH MLS/HR: 50 INJECTION INTRAMUSCULAR; INTRAVENOUS at 09:24

## 2018-01-26 RX ADMIN — NICOTINE SCH PATCH: 14 PATCH, EXTENDED RELEASE TOPICAL at 23:53

## 2018-01-26 RX ADMIN — Medication SCH ML: at 21:00

## 2018-01-26 RX ADMIN — ACETAMINOPHEN PRN MG: 325 TABLET ORAL at 13:48

## 2018-01-26 NOTE — RADRPT
EXAM DATE/TIME:  01/26/2018 16:56 

 

[facility] COMPARISON:     

CT ABDOMEN & PELVIS W CONTRAST, January 25, 2018, 21:34.

       

 

 

INDICATIONS :     

Abscess. 

                     

 

CONTRAST:     

17 cc Omniscan (gadodiamide) IV

                     

 

MEDICAL HISTORY :           

IVDA

 

SURGICAL HISTORY :     

None.     

 

ENCOUNTER:     

Initial

 

ACUITY:     

1 day

 

PAIN SCORE:     

4/10

 

LOCATION:       

Paraspinal 

 

TECHNIQUE:     

Multiplanar multisequence MRI of the lumbar spine was performed with and without contrast.

 

FINDINGS:     

The most caudal appearing lumbar vertebra is numbered as L5.

 

VERTEBRAE:     

Homogeneous signal.  Normal alignment. Early disc desiccation at L5-S1 with loss of T2 signal. Otherw
ise, disc heights are maintained throughout with normal hydration.

 

CONUS:     

Normal level and configuration.

 

POST CONTRAST:     

No abnormal areas of contrast enhancement are seen.

 

T12-L1:  

The thecal sac has a normal diameter.  No evidence of disc bulge or protrusion.  The neural foramina 
are patent bilaterally.

 

L1-L2:  

The thecal sac has a normal diameter.  No evidence of disc bulge or protrusion.  The neural foramina 
are patent bilaterally.

 

L2-L3:  

The thecal sac has a normal diameter.  No evidence of disc bulge or protrusion.  The neural foramina 
are patent bilaterally.

 

L3-L4:  

The thecal sac has a normal diameter.  No evidence of disc bulge or protrusion.  The neural foramina 
are patent bilaterally.

 

L4-L5:  

The thecal sac has a normal diameter.  No evidence of disc bulge or protrusion.  The neural foramina 
are patent bilaterally.

 

L5-S1:  

The thecal sac has a normal diameter.  No evidence of disc bulge or protrusion.  The neural foramina 
are patent bilaterally.

 

CONCLUSION:     

1. Very early degenerative disc disease at the lumbosacral junction with mild disc desiccation.

2. Otherwise negative. Disc and vertebral body heights are maintained at all remaining lumbar levels.
 Spinal canal and neural foramina are widely patent.

3. No abscess identified.

 

 

 

 Adrian Harrison MD on January 26, 2018 at 17:31           

Board Certified Radiologist.

 This report was verified electronically.

## 2018-01-26 NOTE — RADRPT
EXAM DATE/TIME:  01/26/2018 23:09 

 

[facility] COMPARISON:     

No previous studies available for comparison.

 

 

INDICATIONS :     

Shortness of breath.

                      

 

IV CONTRAST:     

65 cc Omnipaque 350 (iohexol) IV 

 

 

RADIATION DOSE:     

10.31 CTDIvol (mGy) 

 

 

MEDICAL HISTORY :       

Drug use.

 

SURGICAL HISTORY :      

None. 

 

ENCOUNTER:      

Initial

 

ACUITY:      

1 day

 

PAIN SCALE:      

0/10

 

LOCATION:       

Bilateral chest 

 

TECHNIQUE:     

Volumetric scanning of the chest was performed using a pulmonary embolism protocol MIP images were re
constructed.  Using automated exposure control and adjustment of the mA and/or kV according to patien
t size, radiation dose was kept as low as reasonably achievable to obtain optimal diagnostic quality 
images.   DICOM format image data is available electronically for review and comparison.  

 

Follow-up recommendations for detected pulmonary nodules are based at a minimum on nodule size and pa
tient risk factors according to Fleischner Society Guidelines.

 

FINDINGS:     

 

PULMONARY ARTERIES:

No filling defects are seen in the pulmonary arteries through the segmental level.

 

LUNGS:     

There is no consolidation or pneumothorax .  No concerning pulmonary nodule is visualized.

 

PLEURAE:     

There is no pleural thickening or pleural effusion.

 

MEDIASTINUM:     

There is good visualization of the great vessels of the middle mediastinum.  No evidence of mediastin
al or hilar adenopathy/mass.

 

MUSCULOSKELETAL:     

Within normal limits for patient age.

 

MISCELLANEOUS:     

The visualized upper abdominal organs demonstrate no acute abnormality.

 

CONCLUSION:     

No pulmonary embolus.

 

 

 

 

 Nahid Enamorado MD on January 26, 2018 at 23:21           

Board Certified Radiologist.

 This report was verified electronically.

## 2018-01-26 NOTE — MB
cc:

DANNI GONZÁLES MD

****

 

 

DATE OF CONSULTATION

18

 

REQUESTING PHYSICIAN

Dr. Dionna Blancas

 

REASON FOR CONSULTATION

Right upper quadrant pain.

 

HISTORY OF PRESENT ILLNESS

The patient is a 27-year-old male who presented to [facility]

emergency department with acute onset of right upper quadrant pain.  This pain

started five days ago and increased in severity to the point where he was

doubled over in pain and called 911.  The patient states he has never had pain

like this before.  No previous abdominal surgeries.  No previous episode of

hepatitis, but does state his dad had  of hepatitis.  He thinks he might

have hepatitis due to a history of IV heroin use.  On evaluation, the patient

was found have elevated leukocytosis as well as elevated transaminases.

Imaging showed significant hepatosplenomegaly with trace amount of fluid in the

gallbladder and the liver, nonspecific, otherwise gallbladder was normal.

Ultrasound showed essentially unremarkable ultrasound of the gallbladder.  Also

of note. apparently a patchy pneumonia in the right base on CT scan.  The

patient is admitted, placed on antibiotics.

 

REVIEW OF SYSTEMS

12-point review of systems was conducted with the patient and is negative

except for the pertinent positives mentioned above in history of present

illness.

 

PAST MEDICAL HISTORY

1.  Bipolar disorder,

2.  Asthma

3.  History of IV drug use.

 

PAST SURGICAL HISTORY

None

 

ALLERGIES

NO KNOWN DRUG ALLERGIES

 

MEDICATIONS

Home medications

1.  Azithromycin

2.  Prednisone.

 

SOCIAL HISTORY

The patient has IV heroin use.  Last time was used was within 24 hours of

admission, occasionally uses alcohol.  Smokes one-pack of cigarettes a day.

 

PHYSICAL EXAMINATION

VITAL SIGNS:  Blood pressure 162/84, respiratory rate 24, pulse 92, temperature

98.4 degrees, O2 saturation 99% on room air.

GENERAL:  The patient is a well-developed, well-nourished  male in no

acute distress.  He does not appear acute or chronically ill.

HEENT:  Head is normocephalic, atraumatic.  Pupils round, react and accommodate

to light.  Sclerae are anicteric.  Oral cavity is clear.  Airway is patent.

NECK: Supple.  No JVD.

LUNGS:  Breath sounds present bilaterally.  Nonlabored breathing pattern.

HEART:  Regular rate and rhythm.

ABDOMEN:  Soft, subjectively tender in the right upper quadrant without

Diego's sign.  No surgical scars or hernias.  No ascites.  Normal bowel

sounds.

EXTREMITIES:  No clubbing, cyanosis or edema.

NEUROLOGIC:  The patient is alert and oriented x3, nonfocal peripheral exam.

Cranial nerves II-XII are intact.  The patient is mildly agitated, but

otherwise judgment and insight appear to be  intact.

 

LABORATORY DATA

White blood cell count 31.7, hemoglobin 12.7, D-dimer 11.3.

 

Bilirubin 0.6, , , phosphorus 100, albumin 2.7.

 

Serologies - hepatitis A - IgM is negative, hepatitis B surface antigen is

negative, hepatitis B core IgM antibody is negative.  Hepatitis C antibody is

reactive.

 

ASSESSMENT/PLAN

The patient is a 27-year-old male acute onset of right-sided abdominal pain,

hepatitis C  positive, IV drug use with possible right lower lobe pneumonia.

The patient has very unremarkable imaging characteristics of his gallbladder,

has no stones.  Doubt the patient has acute cholecystitis and likely his pain

and symptoms and clinical presentation are more consistent with pneumonia or

hepatitis, possibly both.  HIDA scan would help evaluate the gallbladder

further to rule out any acute process.  We go ahead and order a HIDA scan,

although this would likely be negative. If this shows no acute abnormality of

the gallbladder such as acute cholecystitis then would not offer any surgical

intervention as  patient will sign off.  They you very much for this

consultation for this interesting patient.  We will follow up on the HIDA

scan.

Much for

 

 

 

                              _________________________________

                              MD NELLI Hendrickson/

D:  2018/2:54 PM

T:  2018/5:57 PM

Visit #:  [id number]

Job #:  [id number]

## 2018-01-26 NOTE — PD.ID.CON
History of Present Illness


Service


=ID


Consult Requested By


Dr Boone


Reason for Consult


Infection of unknown source


Primary Care Physician


No Primary Care Physician


Diagnoses:  


History of Present Illness


26 yo male with active IVDU (last time on admittion day) presented witn 1 day 

od severe lower back pain, fever, chills


He also has some intermittent RUQ pain with nausea


Has h/o Hep C


He denies any limping or problem walking , no weakness or numbness in BLE


He had CT abd/pel done with  soome gall bladder wall thickening noted, and US 

showed ? acalculous cholecystitis.


He has WBC of 31K with 33% bandemia


Blood clx negative @ 1 day


Unremarkable UA


Seen by surgeon Dr Arielle ALVA ordered





Review of Systems


Constitutional:  COMPLAINS OF: Fever, Chills


Gastrointestinal:  COMPLAINS OF: Abdominal pain, Nausea


Musculoskeletal:  COMPLAINS OF: Back pain


Except as stated in HPI:  all other systems reviewed are Neg





Past Family Social History


Allergies:  


Coded Allergies:  


     No Known Allergies (Verified  Adverse Reaction, Unknown, 1/25/18)


Past Medical History


IV heroin abuse


Asthma


Bipolar Disorder


Tobacco Abuse


.


Past Surgical History


 None


Active Ordered Medications


Medications where reviewed in EMR


Antibiotics Include:  vancomycin


zosyn


Family History


 


Father with diabetes mellitus


Social History


 


Tobacco: smokes 1 PPD


Alcohol: occasional


Illicit Drugs: IV heroin abuse, last injection earlier today





Physical Exam


Vital Signs





Vital Signs








  Date Time  Temp Pulse Resp B/P (MAP) Pulse Ox O2 Delivery O2 Flow Rate FiO2


 


1/26/18 13:54 96.3 73 18 142/72 (95) 100   


 


1/26/18 13:28        


 


1/26/18 13:18  68 16 135/73 (93) 98 Room Air  


 


1/26/18 10:38 98.0 65 15 120/64 (82) 98 Room Air  


 


1/26/18 10:35   16     


 


1/26/18 07:33 98.4 61 17 130/67 (88) 100 Room Air  


 


1/25/18 20:09   20  100 Room Air  


 


1/25/18 18:14 98.4 92 24 162/84 (110) 99   








Physical Exam


 CONSTITUTIONAL/GENERAL: This is an adequately nourished patient, in no 

apparent distress.


TUBES/LINES/DRAINS:


SKIN: No jaundice, rashes, or lesions. + B/L a/c areas niddle marks. Skin 

temperature appropriate. Not diaphoretic. 


HEAD: Atraumatic. Normocephalic.


EYES: Pupils equal and round and reactive. Extraocular motions intact. No 

scleral icterus. No injection or drainage. Fundi not examined.


ENT: Hearing grossly normal. Nose without bleeding or purulent drainage. Throat 

without visible erythema, exudates, masses, or lesions.


NECK: Trachea midline. Supple, nontender.  


CARDIOVASCULAR: Regular rate and rhythm without murmurs, gallops, or rubs. No 

JVD. Peripheral pulses symmetric.


RESPIRATORY/CHEST: Symmetric, unlabored respirations. Clear to auscultation. 

Breath sounds equal bilaterally. No wheezes, rales, or rhonchi.  


GASTROINTESTINAL: Abdomen soft, non-tender, nondistended. No hepato-splenomegaly

, or palpable masses. No guarding. Bowel sounds present.


GENITOURINARY: Without palpable bladder distension.  


MUSCULOSKELETAL: Extremities without clubbing, cyanosis, or edema. No joint 

tenderness or effusion noted. No calf tenderness. No mottling or clubbing.


BACK: no deformities. + ltender to paklpation in lower back area over spine and 

paraspinal areas


LYMPHATICS: No palpable cervical or supraclavicular adenopathy.


NEUROLOGICAL: Awake and alert. Motor and sensory grossly within normal limits. 

Follows commands. Clear speech


. Moves all extremities.


PSYCHIATRIC: No obvious anxiety/depression. no apparent hallucinations or other 

psychotic thought process.


Laboratory





Laboratory Tests








Test


  1/25/18


20:20 1/25/18


23:05 1/26/18


04:20 1/26/18


06:11


 


White Blood Count 21.2    31.7 


 


Red Blood Count 4.82    4.45 


 


Hemoglobin 13.6    12.7 


 


Hematocrit 40.0    37.5 


 


Mean Corpuscular Volume 83.1    84.3 


 


Mean Corpuscular Hemoglobin 28.2    28.6 


 


Mean Corpuscular Hemoglobin


Concent 33.9 


  


  


  33.9 


 


 


Red Cell Distribution Width 13.3    13.5 


 


Platelet Count 261    235 


 


Mean Platelet Volume 8.4    8.9 


 


Neutrophils (%) (Auto) 96.7    91.0 


 


Lymphocytes (%) (Auto) 2.3    5.8 


 


Monocytes (%) (Auto) 0.5    2.7 


 


Eosinophils (%) (Auto) 0.3    0.4 


 


Basophils (%) (Auto) 0.2    0.1 


 


Neutrophils # (Auto) 20.5    28.9 


 


Lymphocytes # (Auto) 0.5    1.9 


 


Monocytes # (Auto) 0.1    0.8 


 


Eosinophils # (Auto) 0.1    0.1 


 


Basophils # (Auto) 0.0    0.0 


 


CBC Comment DIFF FINAL    AUTO DIFF 


 


Differential Comment


   


  


  


  FINAL DIFF


MANUAL


 


Blood Urea Nitrogen 12    9 


 


Creatinine 0.90    0.73 


 


Random Glucose 93    73 


 


Total Protein 7.3    6.5 


 


Albumin 3.2    2.7 


 


Calcium Level 8.6    7.7 


 


Alkaline Phosphatase 133    100 


 


Aspartate Amino Transf


(AST/SGOT) 277 


  


  


  120 


 


 


Alanine Aminotransferase


(ALT/SGPT) 163 


  


  


  150 


 


 


Total Bilirubin 0.7    0.6 


 


Sodium Level 136    141 


 


Potassium Level 3.3    3.5 


 


Chloride Level 101    108 


 


Carbon Dioxide Level 26.1    25.2 


 


Anion Gap 9    8 


 


Estimat Glomerular Filtration


Rate 102 


  


  


  130 


 


 


Lipase 78    


 


Lactic Acid Level  2.2  1.1  


 


Differential Total Cells


Counted 


  


  


  100 


 


 


Neutrophils % (Manual)    61 


 


Band Neutrophils %    33 


 


Lymphocytes %    5 


 


Monocytes %    1 


 


Neutrophils # (Manual)    29.8 


 


Toxic Vacuolation    PRESENT 


 


Platelet Estimate    NORMAL 


 


Platelet Morphology Comment    NORMAL 


 


Red Cell Morphology Comment    NORMAL 


 


Total Creatine Kinase    135 


 


B-Type Natriuretic Peptide    59 


 


Hepatitis A IgM Antibody    NEGATIVE 


 


Hepatitis B Surface Antigen    NEGATIVE 


 


Hepatitis B Core IgM Antibody    NEGATIVE 


 


Hepatitis C Antibody    REACTIVE 


 


Test


  1/26/18


10:22 1/26/18


11:46 


  


 


 


Urine Color YELLOW    


 


Urine Turbidity CLEAR    


 


Urine pH 7.0    


 


Urine Specific Gravity 1.008    


 


Urine Protein NEG    


 


Urine Glucose (UA) NEG    


 


Urine Ketones NEG    


 


Urine Occult Blood NEG    


 


Urine Nitrite NEG    


 


Urine Bilirubin NEG    


 


Urine Urobilinogen LESS THAN 2.0    


 


Urine Leukocyte Esterase NEG    


 


Urine RBC LESS THAN 1    


 


Urine WBC 1    


 


Urine Squamous Epithelial


Cells <1 


  


  


  


 


 


Urine Bacteria RARE    


 


Microscopic Urinalysis Comment


  CULT NOT


INDICATED 


  


  


 


 


Prothrombin Time  12.0   


 


Prothromb Time International


Ratio 


  1.2 


  


  


 


 


D-Dimer Quantitative (PE/DVT)  11.37   














 Date/Time


Source Procedure


Growth Status


 


 


 1/25/18 22:20


Blood Peripheral Aerobic Blood Culture - Preliminary


NO GROWTH IN 1 DAY Resulted


 


 1/25/18 22:20


Blood Peripheral Anaerobic Blood Culture - Preliminary


NO GROWTH IN 1 DAY Resulted


 


 1/26/18 09:20


Nasal Washing Influenza Types A,B Antigen (RAINA) - Final


NEGATIVE FOR FLU A AND B ANTIGEN.... Complete








Result Diagram:  


1/26/18 0611                                                                   

             1/26/18 0611





Imaging





Last Impressions








Gall Bladder Ultrasound 1/26/18 0000 Signed





Impressions: 





 Service Date/Time:  Friday, January 26, 2018 07:56 - CONCLUSION:  1. The 





 gallbladder wall is thickened and there is small volume pericholecystic fluid 





 but no stones, sludge, or distention of the gallbladder. The wall thickening 

and 





 pericholecystic fluid can relate to venous congestion as seen with aggressive 





 crystalloid therapy, right-sided heart failure, and hepatocellular disease. I 





 cannot completely exclude acalculous cholecystitis.     Mt Lew Jr., MD 


 


Abdomen/Pelvis CT 1/25/18 2001 Signed





Impressions: 





 Service Date/Time:  Thursday, January 25, 2018 21:34 - CONCLUSION:  1. No 





 evidence of bilateral Friday as are other acute renal abnormality. 2. 





 Hepatomegaly with periportal edema and also trace fluid around the 

gallbladder. 





 Please see above. No perceptible inflammatory changes. 3. Upper limits of 

normal 





 to mildly enlarged spleen, nonspecific. 4. Apparent patchy pneumonia in the 





 visualized right base. 5. Small fat containing umbilical hernia.     Nahid Cueva MD 











Assessment and Plan


Assessment and Plan


New onset  lower back pain in seting of IVDU-er


   - r/o diskitis, osteomyelitis, or epuidural abscess


Leukocytosis, bandemia - leukemoid reaction


Doubt cholecystitis


   - dw Dr Trimble: HIDA to be done in am





-  cont broad spectrum abx for now


MRI L back


fu HIDA in am


Discussed Condition With


Janet Morel MD Jan 26, 2018 16:38

## 2018-01-26 NOTE — EKG
Date Performed: 01/25/2018       Time Performed: 20:24:30

 

PTAGE:      27 years

 

EKG:      Sinus rhythm 

 

 POSSIBLE PROLONGED QT AND/OR TU FUSION ABNORMAL ECG 

 

NO PREVIOUS TRACING            

 

DOCTOR:   Akshat Parker  Interpretating Date/Time  01/26/2018 08:55:23

## 2018-01-26 NOTE — HHI.HP
__________________________________________________





H[facility]


[facility]


[facility]


Primary Care Physician


No Primary Care Physician


Admission Diagnosis





Septic Embolic vs R lung base pneumonia


Diagnoses:  


Chief Complaint:  


Low back pain


Travel History


International Travel<30 Days:  No


Contact w/Intl Traveler <30 Da:  No


Traveled to Known Affected Are:  No


History of Present Illness


28 y/o male with a history of IV heroin drug abuse, asthma, and bipolar 

disorder presented to the ED for evaluation of severe low back pain.  The 

patient reports that his "lungs were on fire" and he had "kidney pain".  He 

states his symptoms started today and were accompanied by fever/chills.  He 

denies sore throat, cough.  Denies chest pain with the exception of "lung pain" 

on deep inspiration.  Denies nausea/vomiting.  Vital signs: Temperature 98.4, 

pulse 92, respirations 24, /84, pulse ox 99% on room air.


.





Review of Systems


Except as stated in HPI:  all other systems reviewed are Neg


Positive pleuritic pain, fevers, chills, bilateral flank pain





Past Family Social History


Past Medical History


IV heroin abuse


Asthma


Bipolar Disorder


Tobacco Abuse


.


Past Surgical History


None


Reported Medications





Reported Meds & Active Scripts


Active


Azithromycin 500 Mg Tab 500 Mg PO DAILY


Deltasone (Prednisone) 20 Mg Tab 20 Mg PO BID


Allergies:  


Coded Allergies:  


     No Known Allergies (Verified  Adverse Reaction, Unknown, 18)


Family History


Father with diabetes mellitus


Social History


Tobacco: smokes 1 PPD


Alcohol: occasional


Illicit Drugs: IV heroin abuse, last injection earlier today


.





Physical Exam


Vital Signs





Vital Signs








  Date Time  Temp Pulse Resp B/P (MAP) Pulse Ox O2 Delivery O2 Flow Rate FiO2


 


18 20:09   20  100 Room Air  


 


18 18:14 98.4 92 24 162/84 (110) 99   








Physical Exam


GENERAL: Thin  male lying in bed.


SKIN: No rashes, ecchymoses or lesions. Cool and dry.


HEAD: Atraumatic. Normocephalic. No temporal or scalp tenderness.


EYES: Pupils equal round and reactive. Extraocular motions intact. No scleral 

icterus. No injection or drainage. 


ENT: Nose without bleeding, purulent drainage or septal hematoma. Throat 

without erythema, tonsillar hypertrophy or exudate. Uvula midline. Airway 

patent.


NECK: Trachea midline. No JVD or lymphadenopathy. Supple, nontender, no 

meningeal signs.


CARDIOVASCULAR: Regular rate and rhythm without murmurs, gallops, or rubs. 


RESPIRATORY: Clear to auscultation. Breath sounds equal bilaterally. No wheezes

, rales, or rhonchi.  


GASTROINTESTINAL: Abdomen soft, non-tender, nondistended. No hepato-splenomegaly

, or palpable masses. No guarding.


MUSCULOSKELETAL: Extremities without clubbing, cyanosis, or edema. No joint 

tenderness, effusion, or edema noted. No calf tenderness. 


NEUROLOGICAL: Awake and alert. Cranial nerves II through XII intact.  Motor and 

sensory grossly within normal limits. Normal speech.


Laboratory





Laboratory Tests








Test


  18


20:20 18


23:05


 


White Blood Count 21.2  


 


Red Blood Count 4.82  


 


Hemoglobin 13.6  


 


Hematocrit 40.0  


 


Mean Corpuscular Volume 83.1  


 


Mean Corpuscular Hemoglobin 28.2  


 


Mean Corpuscular Hemoglobin


Concent 33.9 


  


 


 


Red Cell Distribution Width 13.3  


 


Platelet Count 261  


 


Mean Platelet Volume 8.4  


 


Neutrophils (%) (Auto) 96.7  


 


Lymphocytes (%) (Auto) 2.3  


 


Monocytes (%) (Auto) 0.5  


 


Eosinophils (%) (Auto) 0.3  


 


Basophils (%) (Auto) 0.2  


 


Neutrophils # (Auto) 20.5  


 


Lymphocytes # (Auto) 0.5  


 


Monocytes # (Auto) 0.1  


 


Eosinophils # (Auto) 0.1  


 


Basophils # (Auto) 0.0  


 


CBC Comment DIFF FINAL  


 


Differential Comment   


 


Blood Urea Nitrogen 12  


 


Creatinine 0.90  


 


Random Glucose 93  


 


Total Protein 7.3  


 


Albumin 3.2  


 


Calcium Level 8.6  


 


Alkaline Phosphatase 133  


 


Aspartate Amino Transf


(AST/SGOT) 277 


  


 


 


Alanine Aminotransferase


(ALT/SGPT) 163 


  


 


 


Total Bilirubin 0.7  


 


Sodium Level 136  


 


Potassium Level 3.3  


 


Chloride Level 101  


 


Carbon Dioxide Level 26.1  


 


Anion Gap 9  


 


Estimat Glomerular Filtration


Rate 102 


  


 


 


Lipase 78  


 


Lactic Acid Level  2.2 














 Date/Time


Source Procedure


Growth Status


 


 


 18 22:20


Blood Peripheral Aerobic Blood Culture


Pending Received


 


 18 22:20


Blood Peripheral Anaerobic Blood Culture


Pending Received








Result Diagram:  


18





Imaging





Last Impressions








Abdomen/Pelvis CT 18 Signed





Impressions: 





 Service Date/Time:   21:34 - CONCLUSION:  1. No 





 evidence of bilateral Friday as are other acute renal abnormality. 2. 





 Hepatomegaly with periportal edema and also trace fluid around the 

gallbladder. 





 Please see above. No perceptible inflammatory changes. 3. Upper limits of 

normal 





 to mildly enlarged spleen, nonspecific. 4. Apparent patchy pneumonia in the 





 visualized right base. 5. Small fat containing umbilical hernia.     John E. Agles, MD Caprini VTE Risk Assessment


Caprini VTE Risk Assessment:  No/Low Risk (score <= 1)


Caprini Risk Assessment Model











 Point Value = 1          Point Value = 2  Point Value = 3  Point Value = 5


 


Age 41-60


Minor surgery


BMI > 25 kg/m2


Swollen legs


Varicose veins


Pregnancy or postpartum


History of unexplained or recurrent


   spontaneous 


Oral contraceptives or hormone


   replacement


Sepsis (< 1 month)


Serious lung disease, including


   pneumonia (< 1 month)


Abnormal pulmonary function


Acute myocardial infarction


Congestive heart failure (< 1 month)


History of inflammatory bowel disease


Medical patient at bed rest Age 61-74


Arthroscopic surgery


Major open surgery (> 45 min)


Laparoscopic surgery (> 45 min)


Malignancy


Confined to bed (> 72 hours)


Immobilizing plaster cast


Central venous access Age >= 75


History of VTE


Family history of VTE


Factor V Leiden


Prothrombin 63861T


Lupus anticoagulant


Anticardiolipin antibodies


Elevated serum homocysteine


Heparin-induced thrombocytopenia


Other congenital or acquired


   thrombophilia Stroke (< 1 month)


Elective arthroplasty


Hip, pelvis, or leg fracture


Acute spinal cord injury (< 1 month)








Prophylaxis Regimen











   Total Risk


Factor Score Risk Level Prophylaxis Regimen


 


0-1      Low Early ambulation


 


2 Moderate Order ONE of the following:


*Sequential Compression Device (SCD)


*Heparin 5000 units SQ BID


 


3-4 Higher Order ONE of the following medications:


*Heparin 5000 units SQ TID


*Enoxaparin/Lovenox 40 mg SQ daily (WT < 150 kg, CrCl > 30 mL/min)


*Enoxaparin/Lovenox 30 mg SQ daily (WT < 150 kg, CrCl > 10-29 mL/min)


*Enoxaparin/Lovenox 30 mg SQ BID (WT < 150 kg, CrCl > 30 mL/min)


AND/OR


*Sequential Compression Device (SCD)


 


5 or more Highest Order ONE of the following medications:


*Heparin 5000 units SQ TID (Preferred with Epidurals)


*Enoxaparin/Lovenox 40 mg SQ daily (WT < 150 kg, CrCl > 30 mL/min)


*Enoxaparin/Lovenox 30 mg SQ daily (WT < 150 kg, CrCl > 10-29 mL/min)


*Enoxaparin/Lovenox 30 mg SQ BID (WT < 150 kg, CrCl > 30 mL/min)


AND


*Sequential Compression Device (SCD)











Assessment and Plan


Assessment and Plan


28 y/o male with a history of IV heroin drug abuse, asthma, and bipolar 

disorder presented to the ED for evaluation of severe low back pain.





Right lung base patchy pneumonia vs septic emboli


- WBC 21.2 with left shift, lactic acid 2.2 - repeat labs and follow results


- IV Vancomycin and Zosyn


- Check echocardiogram


- Duonebs


- Telemetry





Transaminitis


- Abdomen/Pelvis CT shows hepatomegaly with periportal edema and trace fluid 

around gallbladder


- check gallbladder ultrasound


- check hepatitis profile





IV heroin abuse and tobacco abuse


- advised cessation





FEN


- NS at 100 cc/hr


- Potassium 3.3 - replaced - monitor labs


- Regular diet


- early ambulation


.


Discussed Condition With


ER physician and patient





Physician Certification


2 Midnight Certification Type:  Admission for Inpatient Services


Order for Inpatient Services


The services are ordered in accordance with Medicare regulations or non-

Medicare payer requirements, as applicable.  In the case of services not 

specified as inpatient-only, they are appropriately provided as inpatient 

services in accordance with the 2-midnight benchmark.


Estimated LOS (days):  4


4 days is the estimated time the patient will need to remain in the hospital, 

assuming treatment plan goals are met and no additional complications.


Post-Hospital Plan:  Home











Dionna Blancas MD 2018 02:37

## 2018-01-26 NOTE — ECHRPT
Indication:   POSS SEPSIS, ENDOCARDITIS

 

 CONCLUSIONS

 The left ventricular systolic function is normal with an estimated ejection fraction in the range of
 55-60%. 

 Trace mitral valve regurgitation. 

 There is mild tricuspid valve regurgitation. 

 

 No evidence of endocarditis noted

 

 BP:  162   / 84      HR: 92                       Rhythm:           Sinus

 

 MEASUREMENTS  (Male / Female) Normal Values       Technical Quality:Fair

 2D ECHO

 LV Diastolic Diameter PLAX        5.3 cm                4.2 - 5.9 / 3.9 - 5.3 cm

 LV Systolic Diameter PLAX         3.8 cm                

 IVS Diastolic Thickness           0.9 cm                0.6 - 1.0 / 0.6 - 0.9 cm

 LVPW Diastolic Thickness          0.9 cm                0.6 - 1.0 / 0.6 - 0.9 cm

 LV Relative Wall Thickness        0.3                   

 RV Internal Dim ED PLAX           3.4 cm                

 LVOT Diameter                     2.4 cm                

 Aortic Root Diameter              3.3 cm                

 LA Systolic Diameter LX           3.7 cm                3.0 - 4.0 / 2.7 - 3.8 cm

 

 M-MODE

 AV Cusp Separation MM             2.3 cm                

 

 DOPPLER

 AV Peak Velocity                  163.0 cm/s            

 AV Peak Gradient                  10.6 mmHg             

 AV Mean Gradient                  6.0 mmHg              

 AV Velocity Time Integral         30.7 cm               

 LVOT Peak Velocity                118.0 cm/s            

 LVOT Peak Gradient                5.6 mmHg              

 LVOT Velocity Time Integral       21.8 cm               

 AV Area Cont Eq vti               3.2 cm               

 AV Area Cont Eq pk                3.3 cm               

 Mitral E Point Velocity           94.3 cm/s             

 Mitral A Point Velocity           50.3 cm/s             

 Mitral E to A Ratio               1.9                   

 LV E' Lateral Velocity            22.6 cm/s             

 Mitral E to LV E' Lateral Ratio   4.2                   

 LV E' Septal Velocity             23.3 cm/s             

 Mitral E to LV E' Septal Ratio    4.0                   

 TR Peak Velocity                  289.0 cm/s            

 TR Peak Gradient                  33.4 mmHg             

 Right Atrial Pressure             10.0 mmHg             

 Pulmonary Artery Systolic Pressu  43.4 mmHg             

 Right Ventricular Systolic Press  43.4 mmHg             

 PV Peak Velocity                  105.0 cm/s            

 PV Peak Gradient                  4.4 mmHg              

 

 

 FINDINGS

 

 LEFT VENTRICLE

 Normal left ventricular size. 

 Wall thickness is normal. 

 The left ventricular systolic function is normal with an estimated ejection fraction in the range of
 55-60%. 

 

 RIGHT VENTRICLE

 Normal right ventricular size and systolic function. 

 

 LEFT ATRIUM

 The left atrial size is normal. 

 

 RIGHT ATRIUM

 The right atrial size is mildly dilated. 

 

 ATRIAL SEPTUM

 No atrial level shunt is demonstrated by color flow Doppler interrogation. 

 

 AORTA

 The aortic root and proximal ascending aorta are normal in size on limited imaging. 

 

 MITRAL VALVE

 Structurally normal mitral valve.

 Trace mitral valve regurgitation. 

 No mitral valve stenosis. 

 

 AORTIC VALVE

 Trileaflet aortic valve. No aortic valve stenosis or regurgitation. 

 

 TRICUSPID VALVE

 Structurally normal tricuspid valve. 

 There is mild tricuspid valve regurgitation. 

 The estimated pulmonary arterial pressure is 43.4 mmHg. 

 

 PULMONARY VALVE

 No pulmonary valve regurgitation or stenosis. 

 

 PERICARDIUM

 No pericardial effusion. 

 

 

 

 

  Anil Perla DO

  (Electronically Signed)

  Final Date:26 January 2018 18:58

## 2018-01-26 NOTE — RADRPT
EXAM DATE/TIME:  01/26/2018 07:56 

 

[facility] COMPARISON:     

CT ABDOMEN & PELVIS W CONTRAST, January 25, 2018, 21:34.

        

 

 

INDICATIONS :     

Right upper quadrant pain.

                     

 

MEDICAL HISTORY :           

Asthma. ADHD. Bipolar disorder. IV drug use. Tobacco use. 

 

SURGICAL HISTORY :     

None.     

 

ENCOUNTER:     

Initial

 

ACUITY:     

1 day

 

PAIN SCORE:     

3/10

 

LOCATION:     

Right upper quadrant 

                     

MEASUREMENTS:     

 

LIVER:     

18.1 cm length 

 

COMMON DUCT:     

4 mm

 

RIGHT KIDNEY:     

13.4 x 6.0 x 5.7 cm

 

FINDINGS:     

 

LIVER:     

Normal echotexture without focal lesion or ductal dilatation. Hepatopedal flow within the portal vein
. No engorgement of the hepatic veins. 

 

COMMON DUCT:     

No intraluminal mass or stone visualized.  

 

GALLBLADDER:          

The gallbladder is not distended. The wall measures 6 mm which is out of the range of normal even for
 the degree of distention. There is small volume pericholecystic fluid. No stones or sludge.

 

PANCREAS:          

The visualized portions are within normal limits.  

 

RIGHT KIDNEY:          

No evidence of hydronephrosis, stone, or mass.  

 

CONCLUSION:     

1. The gallbladder wall is thickened and there is small volume pericholecystic fluid but no stones, s
ludge, or distention of the gallbladder. The wall thickening and pericholecystic fluid can relate to 
venous congestion as seen with aggressive crystalloid therapy, right-sided heart failure, and hepatoc
ellular disease. I cannot completely exclude acalculous cholecystitis.

 

 

 

 Mt Lew Jr., MD on January 26, 2018 at 9:40           

Board Certified Radiologist.

 This report was verified electronically.

## 2018-01-26 NOTE — PD.CONS
HPI


History of Present Illness


This is a 27 year old male who came into the hospital on 01/25/18 with symptoms 

of low back pain accompanied with fever chills and lung pain.  Patient is 

currently being treated with IV vancomycin and Zosyn.  He also notes some right 

upper quadrant abdominal pain, nausea and vomiting chest continued over the 

past 24 hours, chronic constipation patient states probably due to his IV 

heroin usage.  Current ultrasound of the gallbladder showed some gallbladder 

wall thickening, but no stones or sludge or distention.  Acalculous 

cholecystitis could not be excluded.  Patient shows hep C reactive antibody 

present.  He is discussing in detail his IV heroin addiction which is been for 

the past 7-8 years sharing needles with multiple people.  He states that his 

sister had hepatitis C 10 years ago diagnosed and that he may have gotten 

infected from her.  He admits to a half a gram of heroin per day IV, but doesn'

t have any money for any type of treatment regimen including hepatitis C 

treatment.  Also complains of low back pain but has been told in the past that 

his kidneys haven't been affected from his IV heroin use.  Currently patient 

has no dysphasia.


 (Cherelle Emmanuel)





PFSH


Past Medical History


IV heroin abuse daily half a gram for the last 7-8 years.  Does admit to 

sharing needles.


Asthma


Bipolar Disorder


Tobacco Abuse


Possible history of some kidney disease according to patient


.


Past Surgical History


None


 (Cherelle Emmanuel)


Coded Allergies:  


     No Known Allergies (Verified  Adverse Reaction, Unknown, 1/25/18)


Medications





Administered Medications








 Medications


  (Trade)  Dose


 Ordered  Sig/Berenice


 Route


 PRN Reason  Start Time


 Stop Time Status Last Admin


Dose Admin


 


 Sodium Chloride  1,000 ml @ 


 100 mls/hr  Q10H


 IV


   1/25/18 23:24


    1/26/18 13:48


 


 


 Acetaminophen


  (Tylenol)  650 mg  Q4H  PRN


 PO


 TEMP > 100.4  1/25/18 23:30


    1/26/18 13:48


 


 


 Piperacillin Sod/


 Tazobactam Sod  100 ml @ 


 200 mls/hr  Q6H


 IV


   1/26/18 05:00


    1/26/18 10:54


 


 


 Vancomycin HCl


 1250 mg/Sodium


 Chloride  262.5 ml @ 


 250 mls/hr  Q8H


 IV


   1/26/18 16:00


    1/26/18 15:14


 


 


 Acetaminophen/


 Hydrocodone Bitart


  (Norco  7.5-325


 Mg)  1 tab  Q4H  PRN


 PO


 PAIN SCALE 6 TO 10  1/26/18 14:30


    1/26/18 15:15


 








Family History


Father with diabetes mellitus


Social History


Tobacco: smokes 1 PPD


Alcohol: Denies


Illicit Drugs: IV heroin abuse, last injection earlier today, notes 7-8 years 

of usage, has a sister who also uses.


Patient does not work


.


 (Cherelle Emmanuel)





Review of Systems


Gastrointestinal:  COMPLAINS OF: Abdominal pain (right upper quadrant), Nausea, 

Vomiting


Musculoskeletal:  COMPLAINS OF: Back pain (lower and headache) (Cherelle Emmanuel)





GI Exam


Vitals I&O





Vital Signs








  Date Time  Temp Pulse Resp B/P (MAP) Pulse Ox O2 Delivery O2 Flow Rate FiO2


 


1/26/18 13:54 96.3 73 18 142/72 (95) 100   


 


1/26/18 13:28        


 


1/26/18 13:18  68 16 135/73 (93) 98 Room Air  


 


1/26/18 10:38 98.0 65 15 120/64 (82) 98 Room Air  


 


1/26/18 10:35   16     


 


1/26/18 07:33 98.4 61 17 130/67 (88) 100 Room Air  


 


1/25/18 20:09   20  100 Room Air  


 


1/25/18 18:14 98.4 92 24 162/84 (110) 99   














I/O      


 


 1/25/18 1/25/18 1/25/18 1/26/18 1/26/18 1/26/18





 07:00 15:00 23:00 07:00 15:00 23:00


 


Intake Total     3625.0 ml 


 


Balance     3625.0 ml 


 


      


 


Intake IV Total     3625.0 ml 


 


# Voids     1 








Imaging





Last Impressions








Gall Bladder Ultrasound 1/26/18 0000 Signed





Impressions: 





 Service Date/Time:  Friday, January 26, 2018 07:56 - CONCLUSION:  1. The 





 gallbladder wall is thickened and there is small volume pericholecystic fluid 





 but no stones, sludge, or distention of the gallbladder. The wall thickening 

and 





 pericholecystic fluid can relate to venous congestion as seen with aggressive 





 crystalloid therapy, right-sided heart failure, and hepatocellular disease. I 





 cannot completely exclude acalculous cholecystitis.     Mt Lew Jr., MD 


 


Abdomen/Pelvis CT 1/25/18 2001 Signed





Impressions: 





 Service Date/Time:  Thursday, January 25, 2018 21:34 - CONCLUSION:  1. No 





 evidence of bilateral Friday as are other acute renal abnormality. 2. 





 Hepatomegaly with periportal edema and also trace fluid around the 

gallbladder. 





 Please see above. No perceptible inflammatory changes. 3. Upper limits of 

normal 





 to mildly enlarged spleen, nonspecific. 4. Apparent patchy pneumonia in the 





 visualized right base. 5. Small fat containing umbilical hernia.     Nahid Cueva MD 








Laboratory











Test


  1/25/18


20:20 1/25/18


23:05 1/26/18


04:20 1/26/18


06:11


 


White Blood Count 21.2 TH/MM3    31.7 TH/MM3 


 


Red Blood Count 4.82 MIL/MM3    4.45 MIL/MM3 


 


Hemoglobin 13.6 GM/DL    12.7 GM/DL 


 


Hematocrit 40.0 %    37.5 % 


 


Mean Corpuscular Volume 83.1 FL    84.3 FL 


 


Mean Corpuscular Hemoglobin 28.2 PG    28.6 PG 


 


Mean Corpuscular Hemoglobin


Concent 33.9 % 


  


  


  33.9 % 


 


 


Red Cell Distribution Width 13.3 %    13.5 % 


 


Platelet Count 261 TH/MM3    235 TH/MM3 


 


Mean Platelet Volume 8.4 FL    8.9 FL 


 


Neutrophils (%) (Auto) 96.7 %    91.0 % 


 


Lymphocytes (%) (Auto) 2.3 %    5.8 % 


 


Monocytes (%) (Auto) 0.5 %    2.7 % 


 


Eosinophils (%) (Auto) 0.3 %    0.4 % 


 


Basophils (%) (Auto) 0.2 %    0.1 % 


 


Neutrophils # (Auto) 20.5 TH/MM3    28.9 TH/MM3 


 


Lymphocytes # (Auto) 0.5 TH/MM3    1.9 TH/MM3 


 


Monocytes # (Auto) 0.1 TH/MM3    0.8 TH/MM3 


 


Eosinophils # (Auto) 0.1 TH/MM3    0.1 TH/MM3 


 


Basophils # (Auto) 0.0 TH/MM3    0.0 TH/MM3 


 


CBC Comment DIFF FINAL    AUTO DIFF 


 


Differential Comment


   


  


  


  FINAL DIFF


MANUAL


 


Blood Urea Nitrogen 12 MG/DL    9 MG/DL 


 


Creatinine 0.90 MG/DL    0.73 MG/DL 


 


Random Glucose 93 MG/DL    73 MG/DL 


 


Total Protein 7.3 GM/DL    6.5 GM/DL 


 


Albumin 3.2 GM/DL    2.7 GM/DL 


 


Calcium Level 8.6 MG/DL    7.7 MG/DL 


 


Alkaline Phosphatase 133 U/L    100 U/L 


 


Aspartate Amino Transf


(AST/SGOT) 277 U/L 


  


  


  120 U/L 


 


 


Alanine Aminotransferase


(ALT/SGPT) 163 U/L 


  


  


  150 U/L 


 


 


Total Bilirubin 0.7 MG/DL    0.6 MG/DL 


 


Sodium Level 136 MEQ/L    141 MEQ/L 


 


Potassium Level 3.3 MEQ/L    3.5 MEQ/L 


 


Chloride Level 101 MEQ/L    108 MEQ/L 


 


Carbon Dioxide Level 26.1 MEQ/L    25.2 MEQ/L 


 


Anion Gap 9 MEQ/L    8 MEQ/L 


 


Estimat Glomerular Filtration


Rate 102 ML/MIN 


  


  


  130 ML/MIN 


 


 


Lipase 78 U/L    


 


Lactic Acid Level  2.2 mmol/L  1.1 mmol/L  


 


Differential Total Cells


Counted 


  


  


  100 


 


 


Neutrophils % (Manual)    61 % 


 


Band Neutrophils %    33 % 


 


Lymphocytes %    5 % 


 


Monocytes %    1 % 


 


Neutrophils # (Manual)    29.8 TH/MM3 


 


Toxic Vacuolation    PRESENT 


 


Platelet Estimate    NORMAL 


 


Platelet Morphology Comment    NORMAL 


 


Red Cell Morphology Comment    NORMAL 


 


B-Type Natriuretic Peptide    59 PG/ML 


 


Hepatitis A IgM Antibody    NEGATIVE 


 


Hepatitis B Surface Antigen    NEGATIVE 


 


Hepatitis B Core IgM Antibody    NEGATIVE 


 


Hepatitis C Antibody    REACTIVE 


 


Test


  1/26/18


10:22 1/26/18


11:46 


  


 


 


Urine Color YELLOW    


 


Urine Turbidity CLEAR    


 


Urine pH 7.0    


 


Urine Specific Gravity 1.008    


 


Urine Protein NEG mg/dL    


 


Urine Glucose (UA) NEG mg/dL    


 


Urine Ketones NEG mg/dL    


 


Urine Occult Blood NEG    


 


Urine Nitrite NEG    


 


Urine Bilirubin NEG    


 


Urine Urobilinogen


  LESS THAN 2.0


MG/DL 


  


  


 


 


Urine Leukocyte Esterase NEG    


 


Urine RBC


  LESS THAN 1


/hpf 


  


  


 


 


Urine WBC 1 /hpf    


 


Urine Squamous Epithelial


Cells <1 /hpf 


  


  


  


 


 


Urine Bacteria RARE /hpf    


 


Microscopic Urinalysis Comment


  CULT NOT


INDICATED 


  


  


 


 


Prothrombin Time  12.0 SEC   


 


Prothromb Time International


Ratio 


  1.2 RATIO 


  


  


 


 


D-Dimer Quantitative (PE/DVT)  11.37 MG/L FEU   














 Date/Time


Source Procedure


Growth Status


 


 


 1/25/18 22:20


Blood Peripheral Aerobic Blood Culture - Preliminary


NO GROWTH IN 1 DAY Resulted


 


 1/25/18 22:20


Blood Peripheral Anaerobic Blood Culture - Preliminary


NO GROWTH IN 1 DAY Resulted


 


 1/26/18 09:20


Nasal Washing Influenza Types A,B Antigen (RAINA) - Final


NEGATIVE FOR FLU A AND B ANTIGEN.... Complete








Physical Examination


HEENT: Pupils round and reactive to light; normocephalic; atraumatic; no 

jaundice.  Oral cavity dry but clean


NECK: Neck is supple,


CHEST:  Chest bleed diminished


CARDIAC:  Regular rate and rhythm 


ABDOMEN:  Soft, nondistended, tender right upper quadrant with light palpation 

and movement no hepatosplenomegaly; bowel sounds to all 4 quadrants


EXTREMITIES: No clubbing, cyanosis, or edema.


SKIN:  Dry; no rash; no jaundice.,  Dry blood noted on his hands


CNS:  Mild anxiety, alert and oriented times three.


 (Cherelle Emmanuel)





Assessment and Plan


Plan


Right upper quadrant pain hepatitis C positive with IV drug use positive , 

Possible cholecystitis, gallbladder ultrasound done on 01/26/18 showed 

gallbladder wall thickening, but no stones and no sludge no distention.  This 

could possibly be due to venous congestion; or cholecystitis.  Ultrasound 

cannot exclude acalculous cholelithiasis.  Doubt gallbladder disease patient is 

positive for hep C and IV drug use. 


Leukocytosis increase to 31.7, currently patient's been managed on vancomycin 

and Zosyn


Anemia hemoglobin stable at 12.7


Hepatitis C reactive, admits to daily IV heroin use for the past 7-8 years 

shares, needles.  Doesn't feel like he can get any treatment because he doesn't 

have any money to pay for it.  Patient states he was not aware of his hepatitis 

C reactive but his sister was reactive 10 years ago and states he probably got 

it from her. 


Transaminitis current LFTs  





Plan


Diet as tolerated


Ultrasound of the liver


Labs alpha 1 antitrypsin


Smooth muscle total auto labs


ANAs screen


Hepatitis C RNA genotype


AFP


Iron, ferritin, TIBC


Mitochondrial total auto labs


Ceruloplasmin





This patient was seen by myself and Dr. Mcfadden, this note was done on his 

behalf


 (Cherelle Emmanuel)


Plan


Agree with above note, we will obtain ultrasound and will do liver workup to 

evaluate the liver function test abnormality but most likely hepatitis C related


 (Blane Mcfadden MD)











Cherelle Emmanuel Jan 26, 2018 15:19


Blane Mcfadden MD Jan 26, 2018 17:44

## 2018-01-27 VITALS
TEMPERATURE: 96.6 F | DIASTOLIC BLOOD PRESSURE: 77 MMHG | RESPIRATION RATE: 18 BRPM | HEART RATE: 62 BPM | SYSTOLIC BLOOD PRESSURE: 130 MMHG | OXYGEN SATURATION: 97 %

## 2018-01-27 VITALS
OXYGEN SATURATION: 97 % | TEMPERATURE: 97.9 F | HEART RATE: 62 BPM | RESPIRATION RATE: 16 BRPM | DIASTOLIC BLOOD PRESSURE: 69 MMHG | SYSTOLIC BLOOD PRESSURE: 111 MMHG

## 2018-01-27 VITALS
DIASTOLIC BLOOD PRESSURE: 81 MMHG | RESPIRATION RATE: 18 BRPM | HEART RATE: 63 BPM | TEMPERATURE: 97.5 F | SYSTOLIC BLOOD PRESSURE: 126 MMHG | OXYGEN SATURATION: 100 %

## 2018-01-27 VITALS
DIASTOLIC BLOOD PRESSURE: 80 MMHG | RESPIRATION RATE: 20 BRPM | HEART RATE: 63 BPM | SYSTOLIC BLOOD PRESSURE: 128 MMHG | TEMPERATURE: 96.9 F | OXYGEN SATURATION: 99 %

## 2018-01-27 VITALS — HEART RATE: 66 BPM

## 2018-01-27 LAB
ALBUMIN SERPL-MCNC: 2.7 GM/DL (ref 3.4–5)
ALP SERPL-CCNC: 76 U/L (ref 45–117)
ALT SERPL-CCNC: 104 U/L (ref 12–78)
AST SERPL-CCNC: 43 U/L (ref 15–37)
BILIRUB INDIRECT SERPL-MCNC: 0.1 MG/DL (ref 0–0.8)
BILIRUB SERPL-MCNC: 0.2 MG/DL (ref 0.2–1)
DIRECT BILIRUBIN ADULT: 0.1 MG/DL (ref 0–0.2)
PROT SERPL-MCNC: 6.6 GM/DL (ref 6.4–8.2)

## 2018-01-27 RX ADMIN — HYDROCODONE BITARTRATE AND ACETAMINOPHEN PRN TAB: 7.5; 325 TABLET ORAL at 08:44

## 2018-01-27 RX ADMIN — SODIUM CHLORIDE SCH MLS/HR: 900 INJECTION INTRAVENOUS at 08:46

## 2018-01-27 RX ADMIN — PHENYTOIN SODIUM SCH MLS/HR: 50 INJECTION INTRAMUSCULAR; INTRAVENOUS at 05:24

## 2018-01-27 RX ADMIN — TAZOBACTAM SODIUM AND PIPERACILLIN SODIUM SCH MLS/HR: 500; 4 INJECTION, SOLUTION INTRAVENOUS at 11:00

## 2018-01-27 RX ADMIN — NICOTINE SCH PATCH: 14 PATCH, EXTENDED RELEASE TOPICAL at 12:40

## 2018-01-27 RX ADMIN — TAZOBACTAM SODIUM AND PIPERACILLIN SODIUM SCH MLS/HR: 500; 4 INJECTION, SOLUTION INTRAVENOUS at 21:57

## 2018-01-27 RX ADMIN — SODIUM CHLORIDE SCH MLS/HR: 900 INJECTION INTRAVENOUS at 19:32

## 2018-01-27 RX ADMIN — Medication SCH ML: at 19:33

## 2018-01-27 RX ADMIN — HYDROCODONE BITARTRATE AND ACETAMINOPHEN PRN TAB: 7.5; 325 TABLET ORAL at 18:27

## 2018-01-27 RX ADMIN — PHENYTOIN SODIUM SCH MLS/HR: 50 INJECTION INTRAMUSCULAR; INTRAVENOUS at 15:24

## 2018-01-27 RX ADMIN — TAZOBACTAM SODIUM AND PIPERACILLIN SODIUM SCH MLS/HR: 500; 4 INJECTION, SOLUTION INTRAVENOUS at 04:17

## 2018-01-27 RX ADMIN — HYDROCODONE BITARTRATE AND ACETAMINOPHEN PRN TAB: 7.5; 325 TABLET ORAL at 04:16

## 2018-01-27 RX ADMIN — TAZOBACTAM SODIUM AND PIPERACILLIN SODIUM SCH MLS/HR: 500; 4 INJECTION, SOLUTION INTRAVENOUS at 17:00

## 2018-01-27 RX ADMIN — PHENYTOIN SODIUM SCH MLS/HR: 50 INJECTION INTRAMUSCULAR; INTRAVENOUS at 23:58

## 2018-01-27 RX ADMIN — SODIUM CHLORIDE SCH MLS/HR: 900 INJECTION INTRAVENOUS at 12:39

## 2018-01-27 RX ADMIN — HYDROCODONE BITARTRATE AND ACETAMINOPHEN PRN TAB: 7.5; 325 TABLET ORAL at 21:56

## 2018-01-27 RX ADMIN — HYDROCODONE BITARTRATE AND ACETAMINOPHEN PRN TAB: 7.5; 325 TABLET ORAL at 12:40

## 2018-01-27 RX ADMIN — Medication SCH ML: at 12:41

## 2018-01-27 NOTE — RADRPT
EXAM DATE/TIME:  01/27/2018 10:06 

 

[facility] COMPARISON:     

No previous studies available for comparison.

 

 

INDICATIONS :      

Abdominal and lower back pain. 

                       

 

DOSE:      

4 mCi Tc99m Mebrofenin IV 

                       

 

MEDICATION:     

1.7 mcg Cholecystokinin IV; Indential symptomatic response.

Cholecystokinin was administered by slow infusion over 8 minutes beginning at 60 minutes.

                       

 

MEDICAL HISTORY :        

IV drug abuse, heroin. 

 

SURGICAL HISTORY :          

None. 

 

ENCOUNTER:     

Initial

 

ACUITY:     

1 day

 

PAIN SCALE:     

6/10

 

LOCATION:      

Bilateral  back. 

 

TECHNIQUE:     

Following the intravenous administration of radiotracer, dynamic sequential image were performed with
 continuous acquisition.  Time-activity curves were generated.

 

FINDINGS:     

 

HEPATIIC KINETICS:     

There is prompt uptake of radiotracer in the liver.  No focal defects are seen.  There is normal rate
 of washout from the hepatic parenchyma.

 

BILIARY CLEARANCE:     

Activity is first seen in the extrahepatic biliary system at 10 minutes.  There is normal excretion i
nto the small bowel.

 

GALLBLADDER:     

Activity is first seen in the gallbladder at 15 minutes.

 

POST CHOLECYSTOKININ:     

After Cholecystokinin administration, there is prompt emptying of the gallbladder with a 80% ejection
 fraction.  Common bile duct kinetics are normal and there is no evidence of biliary obstruction.

 

BILIARY ENTERIC REFLUX:     None observed.

 

CONCLUSION:     Biliary scan within normal limits. 

 

 

 Edgar Connell MD on January 27, 2018 at 14:48           

Board Certified Radiologist.

 This report was verified electronically.

## 2018-01-27 NOTE — HHI.IDPN
Subjective


Subjective


Remarks


co diarrhea


co nause, eats 100% of his food


co RUQ pain


MRI back unremarkable


HIDA is normal


Antibiotics


zosyn


vanco


Allergies:  


Coded Allergies:  


     No Known Allergies (Verified  Adverse Reaction, Unknown, 1/25/18)





Objective


.





Vital Signs








  Date Time  Temp Pulse Resp B/P (MAP) Pulse Ox O2 Delivery O2 Flow Rate FiO2


 


1/27/18 08:00 96.9 63 20 128/80 (96) 99   


 


1/27/18 04:10 97.9 58 16 111/69 (83) 97   


 


1/27/18 04:10  62      


 


1/27/18 00:23  66      


 


1/26/18 23:00 97.6 67 16 125/72 (89) 97   


 


1/26/18 20:15 98.4 72 16 126/67 (86) 99   


 


1/26/18 16:39 97.7 77 18 139/70 (93) 99   








.





Laboratory Tests








Test


  1/25/18


20:20 1/26/18


06:11


 


White Blood Count 21.2 TH/MM3  31.7 TH/MM3 


 


Red Blood Count 4.82 MIL/MM3  4.45 MIL/MM3 


 


Hemoglobin 13.6 GM/DL  12.7 GM/DL 


 


Hematocrit 40.0 %  37.5 % 


 


Mean Corpuscular Volume 83.1 FL  84.3 FL 


 


Mean Corpuscular Hemoglobin 28.2 PG  28.6 PG 


 


Mean Corpuscular Hemoglobin


Concent 33.9 % 


  33.9 % 


 


 


Red Cell Distribution Width 13.3 %  13.5 % 


 


Platelet Count 261 TH/MM3  235 TH/MM3 


 


Mean Platelet Volume 8.4 FL  8.9 FL 


 


Neutrophils (%) (Auto) 96.7 %  91.0 % 


 


Lymphocytes (%) (Auto) 2.3 %  5.8 % 


 


Monocytes (%) (Auto) 0.5 %  2.7 % 


 


Eosinophils (%) (Auto) 0.3 %  0.4 % 


 


Basophils (%) (Auto) 0.2 %  0.1 % 


 


Neutrophils # (Auto) 20.5 TH/MM3  28.9 TH/MM3 


 


Lymphocytes # (Auto) 0.5 TH/MM3  1.9 TH/MM3 


 


Monocytes # (Auto) 0.1 TH/MM3  0.8 TH/MM3 


 


Eosinophils # (Auto) 0.1 TH/MM3  0.1 TH/MM3 


 


Basophils # (Auto) 0.0 TH/MM3  0.0 TH/MM3 


 


CBC Comment DIFF FINAL  AUTO DIFF 


 


Differential Comment


   


  FINAL DIFF


MANUAL


 


Differential Total Cells


Counted 


  100 


 


 


Neutrophils % (Manual)  61 % 


 


Band Neutrophils %  33 % 


 


Lymphocytes %  5 % 


 


Monocytes %  1 % 


 


Neutrophils # (Manual)  29.8 TH/MM3 


 


Toxic Vacuolation  PRESENT 


 


Platelet Estimate  NORMAL 


 


Platelet Morphology Comment  NORMAL 


 


Red Cell Morphology Comment  NORMAL 








Laboratory Tests








Test


  1/25/18


20:20 1/25/18


23:05 1/26/18


04:20 1/26/18


06:11


 


Blood Urea Nitrogen 12 MG/DL    9 MG/DL 


 


Creatinine 0.90 MG/DL    0.73 MG/DL 


 


Random Glucose 93 MG/DL    73 MG/DL 


 


Total Protein 7.3 GM/DL    6.5 GM/DL 


 


Albumin 3.2 GM/DL    2.7 GM/DL 


 


Calcium Level 8.6 MG/DL    7.7 MG/DL 


 


Alkaline Phosphatase 133 U/L    100 U/L 


 


Aspartate Amino Transf


(AST/SGOT) 277 U/L 


  


  


  120 U/L 


 


 


Alanine Aminotransferase


(ALT/SGPT) 163 U/L 


  


  


  150 U/L 


 


 


Total Bilirubin 0.7 MG/DL    0.6 MG/DL 


 


Sodium Level 136 MEQ/L    141 MEQ/L 


 


Potassium Level 3.3 MEQ/L    3.5 MEQ/L 


 


Chloride Level 101 MEQ/L    108 MEQ/L 


 


Carbon Dioxide Level 26.1 MEQ/L    25.2 MEQ/L 


 


Anion Gap 9 MEQ/L    8 MEQ/L 


 


Estimat Glomerular Filtration


Rate 102 ML/MIN 


  


  


  130 ML/MIN 


 


 


Lipase 78 U/L    


 


Lactic Acid Level  2.2 mmol/L  1.1 mmol/L  


 


Total Creatine Kinase    135 U/L 


 


B-Type Natriuretic Peptide    59 PG/ML 


 


Test


  1/26/18


18:15 1/27/18


07:41 


  


 


 


Iron Level 52 MCG/DL    


 


Total Iron Binding Capacity 276 MCG/DL    


 


Percent Iron Saturation 18.9 %    


 


Ferritin 169 NG/ML    


 


Tumor Marker Alpha Fetoprotein 1.4 NG/ML    


 


Total Bilirubin  0.2 MG/DL   


 


Direct Bilirubin  0.1 MG/DL   


 


Indirect Bilirubin  0.1 MG/DL   


 


Aspartate Amino Transf


(AST/SGOT) 


  43 U/L 


  


  


 


 


Alanine Aminotransferase


(ALT/SGPT) 


  104 U/L 


  


  


 


 


Alkaline Phosphatase  76 U/L   


 


Total Protein  6.6 GM/DL   


 


Albumin  2.7 GM/DL   








Microbiology








 Date/Time


Source Procedure


Growth Status


 


 


 1/25/18 22:20


Blood Peripheral Aerobic Blood Culture - Preliminary


NO GROWTH IN 2 DAYS Resulted


 


 1/25/18 22:20


Blood Peripheral Anaerobic Blood Culture - Preliminary


NO GROWTH IN 2 DAYS Resulted





 1/25/18 22:00


Blood Peripheral Aerobic Blood Culture - Preliminary


NO GROWTH IN 2 DAYS Resulted


 


 1/25/18 22:00


Blood Peripheral Anaerobic Blood Culture - Preliminary


NO GROWTH IN 2 DAYS Resulted


 


 1/26/18 09:20


Nasal Washing Influenza Types A,B Antigen (RAINA) - Final


NEGATIVE FOR FLU A AND B ANTIGEN.... Complete








Imaging





Last Impressions








Lumbar Spine MRI 1/26/18 0000 Signed





Impressions: 





 Service Date/Time:  Friday, January 26, 2018 16:56 - CONCLUSION:  1. Very 

early 





 degenerative disc disease at the lumbosacral junction with mild disc 





 desiccation. 2. Otherwise negative. Disc and vertebral body heights are 





 maintained at all remaining lumbar levels. Spinal canal and neural foramina 

are 





 widely patent. 3. No abscess identified.     Adrian Harrison MD 


 


Hepatobiliary Scan Nuclear Medicine 1/26/18 0000 Signed





Impressions: 





 Service Date/Time:  Saturday, January 27, 2018 10:06 - CONCLUSION: Biliary 

scan 





 within normal limits.     Edgar Connell MD 


 


Gall Bladder Ultrasound 1/26/18 0000 Signed





Impressions: 





 Service Date/Time:  Friday, January 26, 2018 07:56 - CONCLUSION:  1. The 





 gallbladder wall is thickened and there is small volume pericholecystic fluid 





 but no stones, sludge, or distention of the gallbladder. The wall thickening 

and 





 pericholecystic fluid can relate to venous congestion as seen with aggressive 





 crystalloid therapy, right-sided heart failure, and hepatocellular disease. I 





 cannot completely exclude acalculous cholecystitis.     Mt Lew Jr., MD 


 


CT Angiography 1/26/18 0000 Signed





Impressions: 





 Service Date/Time:  Friday, January 26, 2018 23:09 - CONCLUSION:  No pulmonary 





 embolus.      Nahid Enamorado MD 


 


Abdomen/Pelvis CT 1/25/18 2001 Signed





Impressions: 





 Service Date/Time:  Thursday, January 25, 2018 21:34 - CONCLUSION:  1. No 





 evidence of bilateral Friday as are other acute renal abnormality. 2. 





 Hepatomegaly with periportal edema and also trace fluid around the 

gallbladder. 





 Please see above. No perceptible inflammatory changes. 3. Upper limits of 

normal 





 to mildly enlarged spleen, nonspecific. 4. Apparent patchy pneumonia in the 





 visualized right base. 5. Small fat containing umbilical hernia.     Nahid Cueva MD 








Physical Exam


 CONSTITUTIONAL/GENERAL: This is an adequately nourished patient, in no 

apparent distress.


TUBES/LINES/DRAINS:


SKIN: No jaundice, rashes, or lesions. + B/L a/c areas niddle marks. Skin 

temperature appropriate. Not diaphoretic. 


CARDIOVASCULAR: Regular rate and rhythm without murmurs, gallops, or rubs. No 

JVD. Peripheral pulses symmetric.


RESPIRATORY/CHEST: Symmetric, unlabored respirations. Clear to auscultation. 

Breath sounds equal bilaterally. No wheezes, rales, or rhonchi.  


GASTROINTESTINAL: Abdomen soft, non-tender, nondistended. No hepato-splenomegaly

, or palpable masses. No guarding. Bowel sounds present.


GENITOURINARY: Without palpable bladder distension.  


MUSCULOSKELETAL: Extremities without clubbing, cyanosis, or edema. No joint 

tenderness or effusion noted. No calf tenderness. No mottling or clubbing.


NEUROLOGICAL: Awake and alert. Motor and sensory grossly within normal limits. 

Follows commands. Clear speech


. Moves all extremities.


PSYCHIATRIC: No obvious anxiety/depression. no apparent hallucinations or other 

psychotic thought process.








Assessment & Plan


Remarks


New onset  lower back pain in seting of IVDU-er


   - No e/o  diskitis, osteomyelitis, or epuidural abscess on MRI 


Leukocytosis, bandemia - leukemoid reaction


Doubt cholecystitis


   - HIDA wnl 


-  cont broad spectrum abx for now


Diarrhea 


 


    fu WBC


   chk stool for C.diff


   if BC remain negative will stop abx





Dw Janet Martinez MD Jan 27, 2018 16:15

## 2018-01-27 NOTE — HHI.PR
Subjective


Remarks


Patient c/o profuse diarrhea ever since he got here to the hospital.


Denies fevers or chills.


States that vomited several times on admission


Patient was in the ED on 1/13 and was discharged with azithromycin and 

prednisone.


c/o diffuse abdominal pain, diarrhea, denies fevers or chills.





Objective


Vitals





Vital Signs








  Date Time  Temp Pulse Resp B/P (MAP) Pulse Ox O2 Delivery O2 Flow Rate FiO2


 


1/27/18 08:00 96.9 63 20 128/80 (96) 99   


 


1/27/18 04:10 97.9 58 16 111/69 (83) 97   


 


1/27/18 04:10  62      


 


1/27/18 00:23  66      


 


1/26/18 23:00 97.6 67 16 125/72 (89) 97   


 


1/26/18 20:15 98.4 72 16 126/67 (86) 99   


 


1/26/18 16:39 97.7 77 18 139/70 (93) 99   














I/O      


 


 1/26/18 1/26/18 1/26/18 1/27/18 1/27/18 1/27/18





 07:00 15:00 23:00 07:00 15:00 23:00


 


Intake Total  3625.0 ml 483 ml 1654 ml  


 


Balance  3625.0 ml 483 ml 1654 ml  


 


      


 


Intake Oral   240 ml 0 ml  


 


IV Total  3625.0 ml 243 ml 1654 ml  


 


# Voids  1 1 2  


 


# Bowel Movements   1 0  








Result Diagram:  


1/26/18 0611                                                                   

             1/26/18 0611





Imaging





Last Impressions








Lumbar Spine MRI 1/26/18 0000 Signed





Impressions: 





 Service Date/Time:  Friday, January 26, 2018 16:56 - CONCLUSION:  1. Very 

early 





 degenerative disc disease at the lumbosacral junction with mild disc 





 desiccation. 2. Otherwise negative. Disc and vertebral body heights are 





 maintained at all remaining lumbar levels. Spinal canal and neural foramina 

are 





 widely patent. 3. No abscess identified.     Adrian Harrison MD 


 


Gall Bladder Ultrasound 1/26/18 0000 Signed





Impressions: 





 Service Date/Time:  Friday, January 26, 2018 07:56 - CONCLUSION:  1. The 





 gallbladder wall is thickened and there is small volume pericholecystic fluid 





 but no stones, sludge, or distention of the gallbladder. The wall thickening 

and 





 pericholecystic fluid can relate to venous congestion as seen with aggressive 





 crystalloid therapy, right-sided heart failure, and hepatocellular disease. I 





 cannot completely exclude acalculous cholecystitis.     Mt Lew Jr., MD 


 


CT Angiography 1/26/18 0000 Signed





Impressions: 





 Service Date/Time:  Friday, January 26, 2018 23:09 - CONCLUSION:  No pulmonary 





 embolus.      Nahid Enamorado MD 


 


Abdomen/Pelvis CT 1/25/18 2001 Signed





Impressions: 





 Service Date/Time:  Thursday, January 25, 2018 21:34 - CONCLUSION:  1. No 





 evidence of bilateral Friday as are other acute renal abnormality. 2. 





 Hepatomegaly with periportal edema and also trace fluid around the 

gallbladder. 





 Please see above. No perceptible inflammatory changes. 3. Upper limits of 

normal 





 to mildly enlarged spleen, nonspecific. 4. Apparent patchy pneumonia in the 





 visualized right base. 5. Small fat containing umbilical hernia.     Nahid Cueva MD 








Objective Remarks


AAOx3


nad


LUKASZ, EOMI, anicteric sclera


S1S2 RRR, no MRG


Clear lungs BL


abdomen is soft with hyperactive bowel movements, diffusely tender to palpation

, no guarding or rebound tenderness.


No edema in lower extremities


Procedures


none


Medications and IVs





Current Medications








 Medications


  (Trade)  Dose


 Ordered  Sig/Berenice


 Route  Start Time


 Stop Time Status Last Admin


 


 Sodium Chloride  1,000 ml @ 


 100 mls/hr  Q10H


 IV  1/25/18 23:24


    1/26/18 23:55


 


 


  (NS Flush)  2 ml  UNSCH  PRN


 IV FLUSH  1/25/18 23:30


     


 


 


  (NS Flush)  2 ml  BID


 IV FLUSH  1/26/18 09:00


    1/27/18 12:41


 


 


  (Tylenol)  650 mg  Q4H  PRN


 PO  1/25/18 23:30


    1/26/18 13:48


 


 


  (Zofran Inj)  4 mg  Q6H  PRN


 IVP  1/25/18 23:30


     


 


 


 Pharmacy Profile


 Note  0 ml @ 0


 mls/hr  UNSCH


 OTHER  1/26/18 02:15


     


 


 


 Piperacillin Sod/


 Tazobactam Sod  100 ml @ 


 200 mls/hr  Q6H


 IV  1/26/18 05:00


    1/27/18 04:17


 


 


  (Duoneb Neb)  1 ampule  Q4HR NEB  PRN


 NEB  1/26/18 02:30


     


 


 


 Vancomycin HCl


 1250 mg/Sodium


 Chloride  262.5 ml @ 


 250 mls/hr  Q8H


 IV  1/26/18 16:00


    1/27/18 12:39


 


 


  (Norco  5-325 Mg)  1 tab  Q4H  PRN


 PO  1/26/18 14:30


     


 


 


  (Norco  7.5-325


 Mg)  1 tab  Q4H  PRN


 PO  1/26/18 14:30


    1/27/18 12:40


 


 


  (Narcan Inj)  0.4 mg  UNSCH  PRN


 IV PUSH  1/26/18 14:30


     


 


 


  (Habitrol 14 Mg


 Patch.24 Hr)  1 patch  DAILY


 T-DERMAL  1/26/18 23:45


    1/27/18 12:40


 


 


 Miscellaneous


 Information  1  HS


 T-DERMAL  1/27/18 21:00


     


 











A/P


Problem List:  


(1) Sepsis


ICD Code:  A41.9 - Sepsis, unspecified organism


Plan:  Sepsis present on admission, patient with leukocytosis of 21.2 K, heart 

rate of 92 and respiratory rate of 24.


WBC increasing to 31.7K and bandemia 30% on 1/26 along with toxic vacuolization.


Blood cultures negative 2


Influenza A and B-.


The patient has been started in IV vancomycin and IV Zosyn.


ID consulted.


Continue antibiotics as per ID recommendations.  The patient currently on IV 

vancomycin and IV Zosyn.


Presumed to be secondary to pneumonia, however CTA of the chest ruled out 

pulmonary emboli and also did not show any consolidation or pneumothorax.


Patient is now having diarrhea, suspect sepsis secondary to gastroenteritis.  We

'll check stool studies and C. difficile toxin PCR.





(2) Diarrhea


ICD Code:  R19.7 - Diarrhea, unspecified


Plan:  Patient recently was prescribed azithromycin for an upper respiratory 

infection.


We'll check stool studies and C. difficile toxin PCR.





(3) Heroin abuse


ICD Code:  F11.10 - Opioid abuse, uncomplicated


Plan:  Advised cessation.





(4) Transaminitis


ICD Code:  R74.0 - Nonspecific elevation of levels of transaminase and lactic 

acid dehydrogenase [LDH]


Plan:  On admission patient had elevated transaminases with an ALT of 163 and 

an AST of 277 with normal urobilinogen of 0.7.


Patient has history of hepatitis C.


CT abdomen and pelvis did not show any renal abnormalities.  Showed 

hepatomegaly with periportal edema and trace fluid around the gallbladder.  

Upper limits of normal to mildly enlarged spleen.  Apparent patchy pneumonia in 

the visualized right base.  CTA of the chest ruled out pulmonary emboli and 

also did not show any consolidation or pneumothorax.


Transaminases are trending down.





(5) Tobacco abuse


ICD Code:  Z72.0 - Tobacco use


Plan:  Advised tobacco cessation.


On nicotine patch.





(6) Abdominal pain


ICD Code:  R10.9 - Unspecified abdominal pain


Plan:  Patient with generalized abdominal pain and associated diarrhea.


Checks his studies, check C. difficile toxin PCR.


Continue oral Norco for treatment of pain.


Surgery consulted.  Recommended biliary scan which is within normal limits.


Suspect abdominal pain secondary to gastroenteritis.





(7) Hypokalemia


ICD Code:  E87.6 - Hypokalemia


Status:  Acute


Plan:  I be secondary to GI loss.  Replace orally and continue to monitor BMP.  

Replace as needed.





Assessment and Plan


DVT prophylaxis: SCDs.


Discharge Planning


Continue to monitor in the medical floor.





Problem Qualifiers





(1) Sepsis:  


Qualified Codes:  A41.9 - Sepsis, unspecified organism


(2) Diarrhea:  


Qualified Codes:  R19.7 - Diarrhea, unspecified


(3) Abdominal pain:  


Qualified Codes:  R10.84 - Generalized abdominal pain








Scooby Rothman MD Jan 27, 2018 15:33

## 2018-01-27 NOTE — HHI.GIFU
Subjective


Remarks


Resting in the bed watching a movie friend with him


Seems calmer today less anxious no acute abdominal pain


No nausea vomiting


Diarrhea continues mild


 (Cherelle Emmanuel)





Objective


Vitals I&O





Vital Signs








  Date Time  Temp Pulse Resp B/P (MAP) Pulse Ox O2 Delivery O2 Flow Rate FiO2


 


1/27/18 08:00 96.9 63 20 128/80 (96) 99   


 


1/27/18 04:10 97.9 58 16 111/69 (83) 97   


 


1/27/18 04:10  62      


 


1/27/18 00:23  66      


 


1/26/18 23:00 97.6 67 16 125/72 (89) 97   


 


1/26/18 20:15 98.4 72 16 126/67 (86) 99   














I/O      


 


 1/26/18 1/26/18 1/26/18 1/27/18 1/27/18 1/27/18





 07:00 15:00 23:00 07:00 15:00 23:00


 


Intake Total  3625.0 ml 483 ml 1654 ml 250 ml 


 


Balance  3625.0 ml 483 ml 1654 ml 250 ml 


 


      


 


Intake Oral   240 ml 0 ml  


 


IV Total  3625.0 ml 243 ml 1654 ml 250 ml 


 


# Voids  1 1 2  


 


# Bowel Movements   1 0  








Laboratory





Laboratory Tests








Test


  1/26/18


18:15 1/27/18


07:41 1/27/18


12:18


 


Iron Level 52   


 


Total Iron Binding Capacity 276   


 


Percent Iron Saturation 18.9   


 


Ferritin 169   


 


Tumor Marker Alpha Fetoprotein 1.4   


 


Total Bilirubin  0.2  


 


Direct Bilirubin  0.1  


 


Indirect Bilirubin  0.1  


 


Aspartate Amino Transf


(AST/SGOT) 


  43 


  


 


 


Alanine Aminotransferase


(ALT/SGPT) 


  104 


  


 


 


Alkaline Phosphatase  76  


 


Total Protein  6.6  


 


Albumin  2.7  


 


Vancomycin Level Trough   1.7 














 Date/Time


Source Procedure


Growth Status


 


 


 1/25/18 22:20


Blood Peripheral Aerobic Blood Culture - Preliminary


NO GROWTH IN 2 DAYS Resulted


 


 1/25/18 22:20


Blood Peripheral Anaerobic Blood Culture - Preliminary


NO GROWTH IN 2 DAYS Resulted


 


 1/26/18 09:20


Nasal Washing Influenza Types A,B Antigen (RAINA) - Final


NEGATIVE FOR FLU A AND B ANTIGEN.... Complete








Imaging





Last Impressions








Lumbar Spine MRI 1/26/18 0000 Signed





Impressions: 





 Service Date/Time:  Friday, January 26, 2018 16:56 - CONCLUSION:  1. Very 

early 





 degenerative disc disease at the lumbosacral junction with mild disc 





 desiccation. 2. Otherwise negative. Disc and vertebral body heights are 





 maintained at all remaining lumbar levels. Spinal canal and neural foramina 

are 





 widely patent. 3. No abscess identified.     Adrian Harrison MD 


 


Hepatobiliary Scan Nuclear Medicine 1/26/18 0000 Signed





Impressions: 





 Service Date/Time:  Saturday, January 27, 2018 10:06 - CONCLUSION: Biliary 

scan 





 within normal limits.     Edgar Connell MD 


 


Gall Bladder Ultrasound 1/26/18 0000 Signed





Impressions: 





 Service Date/Time:  Friday, January 26, 2018 07:56 - CONCLUSION:  1. The 





 gallbladder wall is thickened and there is small volume pericholecystic fluid 





 but no stones, sludge, or distention of the gallbladder. The wall thickening 

and 





 pericholecystic fluid can relate to venous congestion as seen with aggressive 





 crystalloid therapy, right-sided heart failure, and hepatocellular disease. I 





 cannot completely exclude acalculous cholecystitis.     Mt Lew Jr., MD 


 


CT Angiography 1/26/18 0000 Signed





Impressions: 





 Service Date/Time:  Friday, January 26, 2018 23:09 - CONCLUSION:  No pulmonary 





 embolus.      Nahid Enamorado MD 


 


Abdomen/Pelvis CT 1/25/18 2001 Signed





Impressions: 





 Service Date/Time:  Thursday, January 25, 2018 21:34 - CONCLUSION:  1. No 





 evidence of bilateral Friday as are other acute renal abnormality. 2. 





 Hepatomegaly with periportal edema and also trace fluid around the 

gallbladder. 





 Please see above. No perceptible inflammatory changes. 3. Upper limits of 

normal 





 to mildly enlarged spleen, nonspecific. 4. Apparent patchy pneumonia in the 





 visualized right base. 5. Small fat containing umbilical hernia.     Nahid Cueva MD 








Physical Exam


HEENT: Pupils round and reactive to light; normocephalic; atraumatic; no 

jaundice.  Oral cavity clean


NECK: Neck is supple, no JVD, no lymphadenopathy.


CHEST:  Chest I'll diminished


CARDIAC:  Regular rate and rhythm 


ABDOMEN:  Soft, nondistended, nontender; no hepatosplenomegaly; bowel sounds 

are present in all four quadrants.


EXTREMITIES: No clubbing, cyanosis, or edema.


SKIN:  Normal; no rash; no jaundice.


CNS:  oriented times three.  Speech clear


 (Cherelle Emmanuel)





Assessment and Plan


Plan


Plan


Right upper quadrant pain hepatitis C positive with IV drug use positive , 

Possible cholecystitis, gallbladder ultrasound done on 01/26/18 showed 

gallbladder wall thickening, but no stones and no sludge no distention.  This 

could possibly be due to venous congestion; or cholecystitis.  Ultrasound 

cannot exclude acalculous cholelithiasis.  Doubt gallbladder disease patient is 

positive for hep C and IV drug use. 


Leukocytosis increase to 31.7, currently patient's been managed on vancomycin 

and Zosyn, no abscess or discitis found on MRI, being followed per ID


Anemia hemoglobin stable .  Possible iron deficiency, normal ferritin and TIBC, 

O2 saturation 8.9, iron level 52


Hepatitis C reactive, admits to daily IV heroin use for the past 7-8 years 

shares, needles. 


Transaminitis current LFTs  


AFP 1.4


Diarrhea continues today, unspecified, possibly due to liver involvement, drugs

, inflammation versus infection





Plan


Diet as tolerated


Stool studies first


Consider Flagyl 500 every 8 by mouth if stool studies okay


Ultrasound of the liver


Liver workup in progress


Currently negative of symptoms


Once labs are completed we will discuss how patient needs


Call for any acute bleeding episodes


Supportive care





Patient was seen by myself and Dr. Mcfadden, murali written on his behalf


 (Cherelle Emmanuel)


Physician Comments


Patient was seen and examined, agree with above-noted, still having diarrhea, 

will check stool workup, elevated liver function does most likely related to 

hepatitis C, patient will need treatment as an outpatient once off any drug 

abuse, we will check hepatitis C viral load


 (Blane Mcfadden MD)











Cherelle Emmanuel Jan 27, 2018 17:17


Blane Mcfadden MD Jan 27, 2018 20:16

## 2018-01-28 VITALS — HEART RATE: 88 BPM

## 2018-01-28 VITALS
OXYGEN SATURATION: 97 % | HEART RATE: 67 BPM | RESPIRATION RATE: 17 BRPM | SYSTOLIC BLOOD PRESSURE: 121 MMHG | DIASTOLIC BLOOD PRESSURE: 74 MMHG | TEMPERATURE: 97.7 F

## 2018-01-28 VITALS
HEART RATE: 60 BPM | RESPIRATION RATE: 18 BRPM | TEMPERATURE: 96.7 F | SYSTOLIC BLOOD PRESSURE: 117 MMHG | DIASTOLIC BLOOD PRESSURE: 63 MMHG | OXYGEN SATURATION: 98 %

## 2018-01-28 VITALS
SYSTOLIC BLOOD PRESSURE: 137 MMHG | HEART RATE: 70 BPM | OXYGEN SATURATION: 98 % | RESPIRATION RATE: 18 BRPM | DIASTOLIC BLOOD PRESSURE: 71 MMHG | TEMPERATURE: 97.4 F

## 2018-01-28 VITALS
HEART RATE: 53 BPM | OXYGEN SATURATION: 98 % | TEMPERATURE: 97 F | RESPIRATION RATE: 16 BRPM | DIASTOLIC BLOOD PRESSURE: 66 MMHG | SYSTOLIC BLOOD PRESSURE: 117 MMHG

## 2018-01-28 VITALS — HEART RATE: 54 BPM

## 2018-01-28 VITALS
SYSTOLIC BLOOD PRESSURE: 101 MMHG | HEART RATE: 63 BPM | OXYGEN SATURATION: 100 % | RESPIRATION RATE: 18 BRPM | DIASTOLIC BLOOD PRESSURE: 65 MMHG | TEMPERATURE: 97.3 F

## 2018-01-28 VITALS
HEART RATE: 69 BPM | DIASTOLIC BLOOD PRESSURE: 79 MMHG | SYSTOLIC BLOOD PRESSURE: 123 MMHG | RESPIRATION RATE: 18 BRPM | OXYGEN SATURATION: 98 %

## 2018-01-28 LAB
BASOPHILS # BLD AUTO: 0.1 TH/MM3 (ref 0–0.2)
BASOPHILS NFR BLD: 0.8 % (ref 0–2)
EOSINOPHIL # BLD: 0.5 TH/MM3 (ref 0–0.4)
EOSINOPHIL NFR BLD: 6.2 % (ref 0–4)
ERYTHROCYTE [DISTWIDTH] IN BLOOD BY AUTOMATED COUNT: 13.3 % (ref 11.6–17.2)
HCT VFR BLD CALC: 39.4 % (ref 39–51)
HGB BLD-MCNC: 13.4 GM/DL (ref 13–17)
LYMPHOCYTES # BLD AUTO: 2.1 TH/MM3 (ref 1–4.8)
LYMPHOCYTES NFR BLD AUTO: 24.4 % (ref 9–44)
MCH RBC QN AUTO: 28.9 PG (ref 27–34)
MCHC RBC AUTO-ENTMCNC: 34 % (ref 32–36)
MCV RBC AUTO: 84.9 FL (ref 80–100)
MONOCYTE #: 0.6 TH/MM3 (ref 0–0.9)
MONOCYTES NFR BLD: 6.7 % (ref 0–8)
NEUTROPHILS # BLD AUTO: 5.3 TH/MM3 (ref 1.8–7.7)
NEUTROPHILS NFR BLD AUTO: 61.9 % (ref 16–70)
PLATELET # BLD: 236 TH/MM3 (ref 150–450)
PMV BLD AUTO: 9.2 FL (ref 7–11)
RBC # BLD AUTO: 4.64 MIL/MM3 (ref 4.5–5.9)
WBC # BLD AUTO: 8.6 TH/MM3 (ref 4–11)

## 2018-01-28 RX ADMIN — PHENYTOIN SODIUM SCH MLS/HR: 50 INJECTION INTRAMUSCULAR; INTRAVENOUS at 20:43

## 2018-01-28 RX ADMIN — SODIUM CHLORIDE SCH MLS/HR: 900 INJECTION INTRAVENOUS at 03:38

## 2018-01-28 RX ADMIN — Medication SCH ML: at 20:35

## 2018-01-28 RX ADMIN — HYDROCODONE BITARTRATE AND ACETAMINOPHEN PRN TAB: 7.5; 325 TABLET ORAL at 20:35

## 2018-01-28 RX ADMIN — HYDROCODONE BITARTRATE AND ACETAMINOPHEN PRN TAB: 7.5; 325 TABLET ORAL at 12:06

## 2018-01-28 RX ADMIN — SODIUM CHLORIDE SCH MLS/HR: 900 INJECTION INTRAVENOUS at 13:37

## 2018-01-28 RX ADMIN — SODIUM CHLORIDE SCH MLS/HR: 900 INJECTION INTRAVENOUS at 21:23

## 2018-01-28 RX ADMIN — HYDROCODONE BITARTRATE AND ACETAMINOPHEN PRN TAB: 7.5; 325 TABLET ORAL at 08:44

## 2018-01-28 RX ADMIN — NICOTINE SCH PATCH: 14 PATCH, EXTENDED RELEASE TOPICAL at 08:45

## 2018-01-28 RX ADMIN — Medication SCH ML: at 09:23

## 2018-01-28 RX ADMIN — TAZOBACTAM SODIUM AND PIPERACILLIN SODIUM SCH MLS/HR: 500; 4 INJECTION, SOLUTION INTRAVENOUS at 17:00

## 2018-01-28 RX ADMIN — HYDROCODONE BITARTRATE AND ACETAMINOPHEN PRN TAB: 7.5; 325 TABLET ORAL at 03:42

## 2018-01-28 RX ADMIN — PHENYTOIN SODIUM SCH MLS/HR: 50 INJECTION INTRAMUSCULAR; INTRAVENOUS at 12:06

## 2018-01-28 RX ADMIN — TAZOBACTAM SODIUM AND PIPERACILLIN SODIUM SCH MLS/HR: 500; 4 INJECTION, SOLUTION INTRAVENOUS at 12:06

## 2018-01-28 RX ADMIN — TAZOBACTAM SODIUM AND PIPERACILLIN SODIUM SCH MLS/HR: 500; 4 INJECTION, SOLUTION INTRAVENOUS at 03:37

## 2018-01-28 NOTE — HHI.PR
Subjective


Remarks


Patient states diarrhea resolved.


Denies cp/sob.


Denies abdominal pain or nausea.





Objective


Vitals





Vital Signs








  Date Time  Temp Pulse Resp B/P (MAP) Pulse Ox O2 Delivery O2 Flow Rate FiO2


 


1/28/18 12:00  69 18 123/79 (94) 98   


 


1/28/18 08:00 97.3 63 18 101/65 (77) 100   


 


1/28/18 03:40 97.7 67 17 121/74 (90) 97   


 


1/28/18 00:00 97.0 53 16 117/66 (83) 98   


 


1/27/18 20:00 96.6 62 18 130/77 (94) 97   














I/O      


 


 1/27/18 1/27/18 1/27/18 1/28/18 1/28/18 1/28/18





 07:00 15:00 23:00 07:00 15:00 23:00


 


Intake Total 1654 ml 850 ml 960 ml 1335 ml  


 


Balance 1654 ml 850 ml 960 ml 1335 ml  


 


      


 


Intake Oral 0 ml 600 ml 960 ml 720 ml  


 


IV Total 1654 ml 250 ml  615 ml  


 


# Voids 2 3 2 2  


 


# Bowel Movements 0 2    








Result Diagram:  


1/28/18 0647                                                                   

             1/26/18 0611





Imaging





Last Impressions








Lumbar Spine MRI 1/26/18 0000 Signed





Impressions: 





 Service Date/Time:  Friday, January 26, 2018 16:56 - CONCLUSION:  1. Very 

early 





 degenerative disc disease at the lumbosacral junction with mild disc 





 desiccation. 2. Otherwise negative. Disc and vertebral body heights are 





 maintained at all remaining lumbar levels. Spinal canal and neural foramina 

are 





 widely patent. 3. No abscess identified.     Adrian Harrison MD 


 


Hepatobiliary Scan Nuclear Medicine 1/26/18 0000 Signed





Impressions: 





 Service Date/Time:  Saturday, January 27, 2018 10:06 - CONCLUSION: Biliary 

scan 





 within normal limits.     Edgar Connell MD 


 


Gall Bladder Ultrasound 1/26/18 0000 Signed





Impressions: 





 Service Date/Time:  Friday, January 26, 2018 07:56 - CONCLUSION:  1. The 





 gallbladder wall is thickened and there is small volume pericholecystic fluid 





 but no stones, sludge, or distention of the gallbladder. The wall thickening 

and 





 pericholecystic fluid can relate to venous congestion as seen with aggressive 





 crystalloid therapy, right-sided heart failure, and hepatocellular disease. I 





 cannot completely exclude acalculous cholecystitis.     Mt Lew Jr., MD 


 


CT Angiography 1/26/18 0000 Signed





Impressions: 





 Service Date/Time:  Friday, January 26, 2018 23:09 - CONCLUSION:  No pulmonary 





 embolus.      Nahid Enamorado MD 


 


Abdomen/Pelvis CT 1/25/18 2001 Signed





Impressions: 





 Service Date/Time:  Thursday, January 25, 2018 21:34 - CONCLUSION:  1. No 





 evidence of bilateral Friday as are other acute renal abnormality. 2. 





 Hepatomegaly with periportal edema and also trace fluid around the 

gallbladder. 





 Please see above. No perceptible inflammatory changes. 3. Upper limits of 

normal 





 to mildly enlarged spleen, nonspecific. 4. Apparent patchy pneumonia in the 





 visualized right base. 5. Small fat containing umbilical hernia.     Nahid Cueva MD 








Objective Remarks


AAOx3


nad


LUKASZ, EOMI, anicteric sclera


S1S2 RRR, no MRG


Clear lungs BL


abdomen is soft with hyperactive bowel movements, diffusely tender to palpation

, no guarding or rebound tenderness.


No edema in lower extremities


Procedures


none


Medications and IVs





Current Medications








 Medications


  (Trade)  Dose


 Ordered  Sig/Berenice


 Route  Start Time


 Stop Time Status Last Admin


 


 Sodium Chloride  1,000 ml @ 


 100 mls/hr  Q10H


 IV  1/25/18 23:24


    1/28/18 12:06


 


 


  (NS Flush)  2 ml  UNSCH  PRN


 IV FLUSH  1/25/18 23:30


     


 


 


  (NS Flush)  2 ml  BID


 IV FLUSH  1/26/18 09:00


    1/28/18 20:35


 


 


  (Tylenol)  650 mg  Q4H  PRN


 PO  1/25/18 23:30


    1/26/18 13:48


 


 


  (Zofran Inj)  4 mg  Q6H  PRN


 IVP  1/25/18 23:30


     


 


 


 Pharmacy Profile


 Note  0 ml @ 0


 mls/hr  UNSCH


 OTHER  1/26/18 02:15


     


 


 


  (Duoneb Neb)  1 ampule  Q4HR NEB  PRN


 NEB  1/26/18 02:30


     


 


 


  (Norco  5-325 Mg)  1 tab  Q4H  PRN


 PO  1/26/18 14:30


     


 


 


  (Norco  7.5-325


 Mg)  1 tab  Q4H  PRN


 PO  1/26/18 14:30


    1/28/18 20:35


 


 


  (Narcan Inj)  0.4 mg  UNSCH  PRN


 IV PUSH  1/26/18 14:30


     


 


 


  (Habitrol 14 Mg


 Patch.24 Hr)  1 patch  DAILY


 T-DERMAL  1/26/18 23:45


    1/28/18 08:45


 


 


 Miscellaneous


 Information  1  HS


 T-DERMAL  1/27/18 21:00


    1/28/18 20:42


 


 


 Vancomycin HCl


 1750 mg/Sodium


 Chloride  517.5 ml @ 


 257.5 mls/


 hr  Q8H


 IV  1/28/18 22:00


    1/28/18 21:23


 


 


 Miscellaneous


 Information  SPECIFIC


 LAB TO BE


 PAMELA...  ONCE  ONCE


 .XX  1/29/18 21:45


 1/29/18 21:46   


 











A/P


Problem List:  


(1) Sepsis


ICD Code:  A41.9 - Sepsis, unspecified organism


Plan:  Sepsis present on admission, patient with leukocytosis of 21.2 K, heart 

rate of 92 and respiratory rate of 24.


WBC increasing to 31.7K and bandemia 30% on 1/26 along with toxic vacuolization.


Blood cultures negative 2


Influenza A and B-.


The patient has been started in IV vancomycin and IV Zosyn.


ID consulted.


Continue antibiotics as per ID recommendations.  The patient currently on IV 

vancomycin and IV Zosyn.


Presumed to be secondary to pneumonia, however CTA of the chest ruled out 

pulmonary emboli and also did not show any consolidation or pneumothorax.


Patient is now having diarrhea, suspect sepsis secondary to gastroenteritis.  We

'll check stool studies and C. difficile toxin PCR.


1/28 c diff negative. Diarrhea resolved. Patient is growing 2 bottles of gram 

positive cocci. Suspect bacteremia likely related to IV drug use. Continue IV 

antibiotics.


2 D echo negative for vegetation.





(2) Diarrhea


ICD Code:  R19.7 - Diarrhea, unspecified


Plan:  Patient recently was prescribed azithromycin for an upper respiratory 

infection.


We'll check stool studies and C. difficile toxin PCR.


1/28 C diff negative. Diarrhea resolved.





(3) Heroin abuse


ICD Code:  F11.10 - Opioid abuse, uncomplicated


Plan:  Advised cessation.





(4) Transaminitis


ICD Code:  R74.0 - Nonspecific elevation of levels of transaminase and lactic 

acid dehydrogenase [LDH]


Plan:  On admission patient had elevated transaminases with an ALT of 163 and 

an AST of 277 with normal urobilinogen of 0.7.


Patient has history of hepatitis C.


CT abdomen and pelvis did not show any renal abnormalities.  Showed 

hepatomegaly with periportal edema and trace fluid around the gallbladder.  

Upper limits of normal to mildly enlarged spleen.  Apparent patchy pneumonia in 

the visualized right base.  CTA of the chest ruled out pulmonary emboli and 

also did not show any consolidation or pneumothorax.


Transaminases are trending down.





(5) Tobacco abuse


ICD Code:  Z72.0 - Tobacco use


Plan:  Advised tobacco cessation.


On nicotine patch.





(6) Abdominal pain


ICD Code:  R10.9 - Unspecified abdominal pain


(7) Hypokalemia


ICD Code:  E87.6 - Hypokalemia


Status:  Acute


Plan:  Due to to GI loss.  Replace orally and continue to monitor BMP.  Replace 

as needed.





Assessment and Plan


DVT prophylaxis: SCDs.


Discharge Planning


Continue to monitor in the medical floor.





Problem Qualifiers





(1) Sepsis:  


Qualified Codes:  A41.9 - Sepsis, unspecified organism


(2) Diarrhea:  


Qualified Codes:  R19.7 - Diarrhea, unspecified


(3) Abdominal pain:  


Qualified Codes:  R10.84 - Generalized abdominal pain








Scooby Rothman MD Jan 28, 2018 16:49

## 2018-01-28 NOTE — HHI.GIFU
Subjective


Remarks


Calm resting in the bed ,friend in the room with him


Right upper quadrant tenderness is resolving, no bloating noted


Continues with diarrhea, no blood noted, 3-4 times a day


 (Cherelle Emmanuel)





Objective


Vitals I&O





Vital Signs








  Date Time  Temp Pulse Resp B/P (MAP) Pulse Ox O2 Delivery O2 Flow Rate FiO2


 


1/28/18 08:00 97.3 63 18 101/65 (77) 100   


 


1/28/18 03:40 97.7 67 17 121/74 (90) 97   


 


1/28/18 00:00 97.0 53 16 117/66 (83) 98   


 


1/27/18 20:00 96.6 62 18 130/77 (94) 97   


 


1/27/18 16:00 97.5 63 18 126/81 (96) 100   














I/O      


 


 1/27/18 1/27/18 1/27/18 1/28/18 1/28/18 1/28/18





 07:00 15:00 23:00 07:00 15:00 23:00


 


Intake Total 1654 ml 850 ml 960 ml 1335 ml  


 


Balance 1654 ml 850 ml 960 ml 1335 ml  


 


      


 


Intake Oral 0 ml 600 ml 960 ml 720 ml  


 


IV Total 1654 ml 250 ml  615 ml  


 


# Voids 2 3 2 2  


 


# Bowel Movements 0 2    








Laboratory





Laboratory Tests








Test


  1/27/18


12:18 1/28/18


06:47 1/28/18


08:50


 


Vancomycin Level Trough 1.7   


 


White Blood Count  8.6  


 


Red Blood Count  4.64  


 


Hemoglobin  13.4  


 


Hematocrit  39.4  


 


Mean Corpuscular Volume  84.9  


 


Mean Corpuscular Hemoglobin  28.9  


 


Mean Corpuscular Hemoglobin


Concent 


  34.0 


  


 


 


Red Cell Distribution Width  13.3  


 


Platelet Count  236  


 


Mean Platelet Volume  9.2  


 


Neutrophils (%) (Auto)  61.9  


 


Lymphocytes (%) (Auto)  24.4  


 


Monocytes (%) (Auto)  6.7  


 


Eosinophils (%) (Auto)  6.2  


 


Basophils (%) (Auto)  0.8  


 


Neutrophils # (Auto)  5.3  


 


Lymphocytes # (Auto)  2.1  


 


Monocytes # (Auto)  0.6  


 


Eosinophils # (Auto)  0.5  


 


Basophils # (Auto)  0.1  


 


CBC Comment  DIFF FINAL  


 


Differential Comment    


 


Stool C. difficile Toxin (PCR)   NEGATIVE 


 


Stl C. difficile Toxin


Epiderm 027 


  


  PRESUMPTIVE


NEGATIVE














 Date/Time


Source Procedure


Growth Status


 


 


 1/25/18 22:20


Blood Peripheral Aerobic Blood Culture - Preliminary


Gram Positive Cocci Resulted


 


 1/25/18 22:20


Blood Peripheral Anaerobic Blood Culture - Preliminary


NO GROWTH IN 2 DAYS Resulted


 


 1/28/18 08:50


Stool Stool 


Pending Received


 


 1/26/18 09:20


Nasal Washing Influenza Types A,B Antigen (RAINA) - Final


NEGATIVE FOR FLU A AND B ANTIGEN.... Complete








Imaging





Last Impressions








Lumbar Spine MRI 1/26/18 0000 Signed





Impressions: 





 Service Date/Time:  Friday, January 26, 2018 16:56 - CONCLUSION:  1. Very 

early 





 degenerative disc disease at the lumbosacral junction with mild disc 





 desiccation. 2. Otherwise negative. Disc and vertebral body heights are 





 maintained at all remaining lumbar levels. Spinal canal and neural foramina 

are 





 widely patent. 3. No abscess identified.     Adrian Harrison MD 


 


Hepatobiliary Scan Nuclear Medicine 1/26/18 0000 Signed





Impressions: 





 Service Date/Time:  Saturday, January 27, 2018 10:06 - CONCLUSION: Biliary 

scan 





 within normal limits.     Edgar Connell MD 


 


Gall Bladder Ultrasound 1/26/18 0000 Signed





Impressions: 





 Service Date/Time:  Friday, January 26, 2018 07:56 - CONCLUSION:  1. The 





 gallbladder wall is thickened and there is small volume pericholecystic fluid 





 but no stones, sludge, or distention of the gallbladder. The wall thickening 

and 





 pericholecystic fluid can relate to venous congestion as seen with aggressive 





 crystalloid therapy, right-sided heart failure, and hepatocellular disease. I 





 cannot completely exclude acalculous cholecystitis.     Mt Lew Jr., MD 


 


CT Angiography 1/26/18 0000 Signed





Impressions: 





 Service Date/Time:  Friday, January 26, 2018 23:09 - CONCLUSION:  No pulmonary 





 embolus.      Nahid Enamorado MD 


 


Abdomen/Pelvis CT 1/25/18 2001 Signed





Impressions: 





 Service Date/Time:  Thursday, January 25, 2018 21:34 - CONCLUSION:  1. No 





 evidence of bilateral Friday as are other acute renal abnormality. 2. 





 Hepatomegaly with periportal edema and also trace fluid around the 

gallbladder. 





 Please see above. No perceptible inflammatory changes. 3. Upper limits of 

normal 





 to mildly enlarged spleen, nonspecific. 4. Apparent patchy pneumonia in the 





 visualized right base. 5. Small fat containing umbilical hernia.     Nahid Cueva MD 








Physical Exam


HEENT: Pupils round and reactive to light; normocephalic; atraumatic; no 

jaundice.  Oral cavity clear


NECK: Neck is supple


CHEST:  Chest sounds much improved essentially clear, no wheezing


CARDIAC:  Regular rate and rhythm 


ABDOMEN:  Soft, nondistended, minimal right upper quadrant tenderness on light 

palpation, resolving; no hepatosplenomegaly; bowel sounds are present in all 

four quadrants.


EXTREMITIES: No clubbing, cyanosis, or edema.


SKIN:  Normal; no rash; no jaundice.


CNS:  oriented times three.  Speech clear, calm affect


 (Cherelle Emmanuel)





Assessment and Plan


Plan


Plan


Right upper quadrant pain hepatitis C positive with IV drug use positive , 

right upper quadrant pain is resolving, minimal tenderness to light palpation, 

abdomen soft non-bloated, bowel sounds are active


Possible cholecystitis currently being ruled out, gallbladder ultrasound done 

on 01/26/18 showed gallbladder wall thickening, but no stones and no sludge no 

distention.  This could possibly be due to venous congestion; or cholecystitis.

  Ultrasound cannot exclude acalculous cholelithiasis.  HIDA scan negative, has 

been seen by general surgery and at this time no surgical needs with negative 

HIDA scan


Leukocytosis resolved today, white count decreased to 8.6


Anemia initially on admission , hemoglobin back up to 13.4


Labs initially showed positive d-dimer 11.37, no PE seen on CT angiography. 


Hepatitis C reactive, admits to daily IV heroin use for the past 7-8 years 

shares, needles. 


AFP 1.4, other liver workup labs are pending


Diarrhea continues today, unspecified, possibly due to liver involvement, drugs

, inflammation versus infection





Plan


Diet as tolerated


Stool studies collected, negative C. difficile


Flagyl 250 mg every 8hr by mouth 


Liver labs workup in progress


Once labs are completed we will discuss how patient needs, we'll need to 

consider outpatient GI follow-up.  Discussed with patient


Call for any acute bleeding episodes


Supportive care





Patient was seen by myself and murali Jones written on his behalf


 (Cherelle Emmanuel)


Plan


Patient was seen and examined, agree with above-noted, doing better today, no 

C. difficile, patient can be follow-up as an outpatient from [facility] for 

his hepatitis C we will follow up as needed


 (Blane Mcfadden MD)











Cherelle Emmanuel Jan 28, 2018 11:02


Blane Mcfadden MD Jan 28, 2018 14:15

## 2018-01-29 VITALS
HEART RATE: 78 BPM | TEMPERATURE: 96.9 F | RESPIRATION RATE: 18 BRPM | SYSTOLIC BLOOD PRESSURE: 120 MMHG | OXYGEN SATURATION: 96 % | DIASTOLIC BLOOD PRESSURE: 82 MMHG

## 2018-01-29 VITALS
SYSTOLIC BLOOD PRESSURE: 122 MMHG | TEMPERATURE: 97 F | DIASTOLIC BLOOD PRESSURE: 84 MMHG | RESPIRATION RATE: 18 BRPM | HEART RATE: 63 BPM | OXYGEN SATURATION: 98 %

## 2018-01-29 VITALS
SYSTOLIC BLOOD PRESSURE: 118 MMHG | RESPIRATION RATE: 18 BRPM | DIASTOLIC BLOOD PRESSURE: 69 MMHG | TEMPERATURE: 97.3 F | HEART RATE: 60 BPM | OXYGEN SATURATION: 96 %

## 2018-01-29 VITALS — HEART RATE: 54 BPM

## 2018-01-29 VITALS — OXYGEN SATURATION: 97 %

## 2018-01-29 LAB
BASOPHILS # BLD AUTO: 0.1 TH/MM3 (ref 0–0.2)
BASOPHILS NFR BLD: 1.2 % (ref 0–2)
BUN SERPL-MCNC: 7 MG/DL (ref 7–18)
CALCIUM SERPL-MCNC: 8.9 MG/DL (ref 8.5–10.1)
CHLORIDE SERPL-SCNC: 106 MEQ/L (ref 98–107)
CREAT SERPL-MCNC: 0.8 MG/DL (ref 0.6–1.3)
EOSINOPHIL # BLD: 0.6 TH/MM3 (ref 0–0.4)
EOSINOPHIL NFR BLD: 5.6 % (ref 0–4)
ERYTHROCYTE [DISTWIDTH] IN BLOOD BY AUTOMATED COUNT: 13.3 % (ref 11.6–17.2)
GFR SERPLBLD BASED ON 1.73 SQ M-ARVRAT: 117 ML/MIN (ref 89–?)
GLUCOSE SERPL-MCNC: 82 MG/DL (ref 74–106)
HCO3 BLD-SCNC: 26.4 MEQ/L (ref 21–32)
HCT VFR BLD CALC: 40.1 % (ref 39–51)
HGB BLD-MCNC: 13.8 GM/DL (ref 13–17)
LYMPHOCYTES # BLD AUTO: 2.7 TH/MM3 (ref 1–4.8)
LYMPHOCYTES NFR BLD AUTO: 24.8 % (ref 9–44)
MAGNESIUM SERPL-MCNC: 2 MG/DL (ref 1.5–2.5)
MCH RBC QN AUTO: 29 PG (ref 27–34)
MCHC RBC AUTO-ENTMCNC: 34.5 % (ref 32–36)
MCV RBC AUTO: 84.1 FL (ref 80–100)
MONOCYTE #: 0.6 TH/MM3 (ref 0–0.9)
MONOCYTES NFR BLD: 5.8 % (ref 0–8)
NEUTROPHILS # BLD AUTO: 6.9 TH/MM3 (ref 1.8–7.7)
NEUTROPHILS NFR BLD AUTO: 62.6 % (ref 16–70)
PLATELET # BLD: 261 TH/MM3 (ref 150–450)
PMV BLD AUTO: 9.4 FL (ref 7–11)
RBC # BLD AUTO: 4.77 MIL/MM3 (ref 4.5–5.9)
SODIUM SERPL-SCNC: 138 MEQ/L (ref 136–145)
WBC # BLD AUTO: 11 TH/MM3 (ref 4–11)

## 2018-01-29 RX ADMIN — HYDROCODONE BITARTRATE AND ACETAMINOPHEN PRN TAB: 7.5; 325 TABLET ORAL at 12:34

## 2018-01-29 RX ADMIN — PHENYTOIN SODIUM SCH MLS/HR: 50 INJECTION INTRAMUSCULAR; INTRAVENOUS at 07:24

## 2018-01-29 RX ADMIN — SODIUM CHLORIDE SCH MLS/HR: 900 INJECTION INTRAVENOUS at 06:08

## 2018-01-29 RX ADMIN — Medication SCH ML: at 08:46

## 2018-01-29 RX ADMIN — HYDROCODONE BITARTRATE AND ACETAMINOPHEN PRN TAB: 7.5; 325 TABLET ORAL at 16:42

## 2018-01-29 RX ADMIN — HYDROCODONE BITARTRATE AND ACETAMINOPHEN PRN TAB: 7.5; 325 TABLET ORAL at 08:44

## 2018-01-29 RX ADMIN — HYDROCODONE BITARTRATE AND ACETAMINOPHEN PRN TAB: 7.5; 325 TABLET ORAL at 05:03

## 2018-01-29 RX ADMIN — NICOTINE SCH PATCH: 14 PATCH, EXTENDED RELEASE TOPICAL at 08:43

## 2018-01-29 NOTE — EKG
Date Performed: 01/29/2018       Time Performed: 04:44:48

 

PTAGE:      27 years

 

EKG:      SINUS BRADYCARDIA WITH MARKED SINUS ARRHYTHMIA Since previous tracing, no significant nur
e noted BORDERLINE ECG

 

PREVIOUS TRACING       :   01/25/2018    20.24.30

 

DOCTOR:   Sanya Garcia  Interpretating Date/Time  01/29/2018 19:13:53

## 2018-01-29 NOTE — HHI.DS
__________________________________________________





Discharge Summary


Admission Date


Jan 25, 2018 at 22:51


Discharge Date:  Jan 29, 2018


Admitting Diagnosis





Septic Embolic vs R lung base pneumonia





(1) Sepsis


ICD Code:  A41.9 - Sepsis, unspecified organism


(2) Diarrhea


ICD Code:  R19.7 - Diarrhea, unspecified


(3) Heroin abuse


ICD Code:  F11.10 - Opioid abuse, uncomplicated


(4) Transaminitis


ICD Code:  R74.0 - Nonspecific elevation of levels of transaminase and lactic 

acid dehydrogenase [LDH]


(5) Tobacco abuse


ICD Code:  Z72.0 - Tobacco use


(6) Abdominal pain


ICD Code:  R10.9 - Unspecified abdominal pain


(7) Hypokalemia


ICD Code:  E87.6 - Hypokalemia


Status:  Acute


Procedures


none


Brief History - From Admission


28 y/o male with a history of IV heroin drug abuse, asthma, and bipolar 

disorder presented to the ED for evaluation of severe low back pain.  The 

patient reports that his "lungs were on fire" and he had "kidney pain".  He 

states his symptoms started today and were accompanied by fever/chills.  He 

denies sore throat, cough.  Denies chest pain with the exception of "lung pain" 

on deep inspiration.  Denies nausea/vomiting.  Vital signs: Temperature 98.4, 

pulse 92, respirations 24, /84, pulse ox 99% on room air.


.


CBC/BMP:  


1/29/18 0750                                                                   

             1/29/18 0750





Significant Findings





Laboratory Tests








Test


  1/26/18


18:15 1/27/18


07:41 1/27/18


12:18 1/28/18


06:47


 


Iron Level


  52 MCG/DL


() 


  


  


 


 


Percent Iron Saturation 18.9 % (20-50)    


 


Hepatitis C Antibody


  REACTIVE


(NEGATIVE) 


  


  


 


 


Aspartate Amino Transf


(AST/SGOT) 


  43 U/L (15-37) 


  


  


 


 


Alanine Aminotransferase


(ALT/SGPT) 


  104 U/L


(12-78) 


  


 


 


Albumin


  


  2.7 GM/DL


(3.4-5.0) 


  


 


 


Vancomycin Level Trough


  


  


  1.7 MCG/ML


(5.0-10.0) 


 


 


Eosinophils (%) (Auto)


  


  


  


  6.2 %


(0.0-4.0)


 


Eosinophils # (Auto)


  


  


  


  0.5 TH/MM3


(0-0.4)


 


Test


  1/28/18


08:50 1/28/18


12:45 1/29/18


07:50 


 


 


Eosinophils (%) (Auto)


  


  


  5.6 %


(0.0-4.0) 


 


 


Eosinophils # (Auto)


  


  


  0.6 TH/MM3


(0-0.4) 


 








Imaging





Last Impressions








Lumbar Spine MRI 1/26/18 0000 Signed





Impressions: 





 Service Date/Time:  Friday, January 26, 2018 16:56 - CONCLUSION:  1. Very 

early 





 degenerative disc disease at the lumbosacral junction with mild disc 





 desiccation. 2. Otherwise negative. Disc and vertebral body heights are 





 maintained at all remaining lumbar levels. Spinal canal and neural foramina 

are 





 widely patent. 3. No abscess identified.     Adrian Harrison MD 


 


Hepatobiliary Scan Nuclear Medicine 1/26/18 0000 Signed





Impressions: 





 Service Date/Time:  Saturday, January 27, 2018 10:06 - CONCLUSION: Biliary 

scan 





 within normal limits.     Edgar Connell MD 


 


Gall Bladder Ultrasound 1/26/18 0000 Signed





Impressions: 





 Service Date/Time:  Friday, January 26, 2018 07:56 - CONCLUSION:  1. The 





 gallbladder wall is thickened and there is small volume pericholecystic fluid 





 but no stones, sludge, or distention of the gallbladder. The wall thickening 

and 





 pericholecystic fluid can relate to venous congestion as seen with aggressive 





 crystalloid therapy, right-sided heart failure, and hepatocellular disease. I 





 cannot completely exclude acalculous cholecystitis.     Mt Lew Jr., MD 


 


CT Angiography 1/26/18 0000 Signed





Impressions: 





 Service Date/Time:  Friday, January 26, 2018 23:09 - CONCLUSION:  No pulmonary 





 embolus.      Nahid Enamorado MD 


 


Abdomen/Pelvis CT 1/25/18 2001 Signed





Impressions: 





 Service Date/Time:  Thursday, January 25, 2018 21:34 - CONCLUSION:  1. No 





 evidence of bilateral Friday as are other acute renal abnormality. 2. 





 Hepatomegaly with periportal edema and also trace fluid around the 

gallbladder. 





 Please see above. No perceptible inflammatory changes. 3. Upper limits of 

normal 





 to mildly enlarged spleen, nonspecific. 4. Apparent patchy pneumonia in the 





 visualized right base. 5. Small fat containing umbilical hernia.     Nahid Cueva MD 








PE at Discharge


AAOx3


nad


LUKASZ, EOMI, anicteric sclera


S1S2 RRR, no MRG


Clear lungs BL


abdomen is soft with hyperactive bowel movements, diffusely tender to palpation

, no guarding or rebound tenderness.


No edema in lower extremities


Pt Condition on Discharge:  Stable


Discharge Disposition:  Discharge Home


Discharge Time:  <= 30 minutes


Discharge Instructions


DIET: Follow Instructions for:  As Tolerated, No Restrictions


Activities you can perform:  Regular-No Restrictions


Follow up Referrals:  


PCP Follow-up - 2 Weeks





Discontinued Medications:  


Azithromycin (Azithromycin) 500 Mg Tab


500 MG PO DAILY for Infection, #5 TAB 0 Refills





Prednisone (Deltasone) 20 Mg Tab


20 MG PO BID, #60 TAB 0 Refills

















Scooby Rothman MD Jan 29, 2018 16:38

## 2018-01-29 NOTE — HHI.PR
cc:   Warren Trimble MD


__________________________________________________





Subjective


Subjective Notes


Ambulating in room


Resolution of RUQ pain





Objective


Vitals/I&O





Vital Signs








  Date Time  Temp Pulse Resp B/P (MAP) Pulse Ox O2 Delivery O2 Flow Rate FiO2


 


1/29/18 13:29     97   


 


1/29/18 08:00 97.0 63 18 122/84 (97)    


 


1/26/18 13:18      Room Air  








Labs





Laboratory Tests








Test


  1/29/18


07:50


 


White Blood Count 11.0 


 


Red Blood Count 4.77 


 


Hemoglobin 13.8 


 


Hematocrit 40.1 


 


Mean Corpuscular Volume 84.1 


 


Mean Corpuscular Hemoglobin 29.0 


 


Mean Corpuscular Hemoglobin


Concent 34.5 


 


 


Red Cell Distribution Width 13.3 


 


Platelet Count 261 


 


Mean Platelet Volume 9.4 


 


Neutrophils (%) (Auto) 62.6 


 


Lymphocytes (%) (Auto) 24.8 


 


Monocytes (%) (Auto) 5.8 


 


Eosinophils (%) (Auto) 5.6 


 


Basophils (%) (Auto) 1.2 


 


Neutrophils # (Auto) 6.9 


 


Lymphocytes # (Auto) 2.7 


 


Monocytes # (Auto) 0.6 


 


Eosinophils # (Auto) 0.6 


 


Basophils # (Auto) 0.1 


 


CBC Comment DIFF FINAL 


 


Differential Comment  


 


Blood Urea Nitrogen 7 


 


Creatinine 0.80 


 


Random Glucose 82 


 


Calcium Level 8.9 


 


Magnesium Level 2.0 


 


Sodium Level 138 


 


Potassium Level 3.9 


 


Chloride Level 106 


 


Carbon Dioxide Level 26.4 


 


Anion Gap 6 


 


Estimat Glomerular Filtration


Rate 117 


 














 Date/Time


Source Procedure


Growth Status


 


 


 1/29/18 13:50


Blood Peripheral Aerobic Blood Culture


Pending Received


 


 1/29/18 13:50


Blood Peripheral Anaerobic Blood Culture


Pending Received


 


 1/28/18 08:50


Stool Stool - Final


NO ENTERIC PATHOGENS DETECTED BY PCR... Complete


 


 1/26/18 09:20


Nasal Washing Influenza Types A,B Antigen (RAINA) - Final


NEGATIVE FOR FLU A AND B ANTIGEN.... Complete








Cardiovascular:  Regular


Lungs:  Clear


Abdomen:  Non-distended, Non-tender


Extremities:  No edema





A/P


Assessment and Plan


27 year old male with RUQ; ? etiology gallbladder 





-HIDA normal 


-Follow hepatitis panel 


-Resolution of RUQ pain; no etiology to gallbladder


-GS will sign off











Amber Gonzalez Jan 29, 2018 15:52

## 2018-01-29 NOTE — HHI.DCPOC
Discharge Care Plan


Diagnosis:  


(1) Hepatitis C


(2) Substance dependence


(3) Hypokalemia


(4) Diarrhea


(5) Tobacco abuse


(6) Sepsis


(7) Abdominal pain


(8) Heroin abuse


(9) Transaminitis


Goals to Promote Your Health


* To prevent worsening of your condition and complications


* To maintain your health at the optimal level


Directions to Meet Your Goals


*** Take your medications as prescribed


*** Follow your dietary instruction


*** Follow activity as directed








*** Keep your appointments as scheduled


*** Take your immunizations and boosters as scheduled


*** If your symptoms worsen call your PCP, if no PCP go to Urgent Care Center 

or Emergency Room***


*** Smoking is Dangerous to Your Health. Avoid second hand smoke***


***Call the 24-hour hour crisis hotline for domestic abuse at 1-960.933.6766***











Scooby Rothman MD Jan 29, 2018 16:33

## 2018-01-29 NOTE — HHI.IDPN
Subjective


Subjective


Remarks


Doing well


afebrile


Blood clx growing diff  org, coag neg staph  (possible  2 different) in 1 sert 

and anaerobic GPC in other 


WBC wnl


resolved abd pain


Antibiotics


 


vanco


Allergies:  


Coded Allergies:  


     No Known Allergies (Verified  Adverse Reaction, Unknown, 1/25/18)





Objective


.





Vital Signs








  Date Time  Temp Pulse Resp B/P (MAP) Pulse Ox O2 Delivery O2 Flow Rate FiO2


 


1/29/18 13:29     97   


 


1/29/18 08:00 97.0 63 18 122/84 (97) 98   


 


1/29/18 03:36  54      


 


1/29/18 03:20 97.3 60 18 118/69 (85) 96   


 


1/29/18 00:59 96.9 78 18 120/82 (95) 96   


 


1/28/18 23:56  54      


 


1/28/18 21:23 97.4 70 18 137/71 (93) 98   


 


1/28/18 18:00  88      


 


1/28/18 16:00 96.7 60 18 117/63 (81) 98   








.





Laboratory Tests








Test


  1/28/18


06:47 1/29/18


07:50


 


White Blood Count 8.6 TH/MM3  11.0 TH/MM3 


 


Red Blood Count 4.64 MIL/MM3  4.77 MIL/MM3 


 


Hemoglobin 13.4 GM/DL  13.8 GM/DL 


 


Hematocrit 39.4 %  40.1 % 


 


Mean Corpuscular Volume 84.9 FL  84.1 FL 


 


Mean Corpuscular Hemoglobin 28.9 PG  29.0 PG 


 


Mean Corpuscular Hemoglobin


Concent 34.0 % 


  34.5 % 


 


 


Red Cell Distribution Width 13.3 %  13.3 % 


 


Platelet Count 236 TH/MM3  261 TH/MM3 


 


Mean Platelet Volume 9.2 FL  9.4 FL 


 


Neutrophils (%) (Auto) 61.9 %  62.6 % 


 


Lymphocytes (%) (Auto) 24.4 %  24.8 % 


 


Monocytes (%) (Auto) 6.7 %  5.8 % 


 


Eosinophils (%) (Auto) 6.2 %  5.6 % 


 


Basophils (%) (Auto) 0.8 %  1.2 % 


 


Neutrophils # (Auto) 5.3 TH/MM3  6.9 TH/MM3 


 


Lymphocytes # (Auto) 2.1 TH/MM3  2.7 TH/MM3 


 


Monocytes # (Auto) 0.6 TH/MM3  0.6 TH/MM3 


 


Eosinophils # (Auto) 0.5 TH/MM3  0.6 TH/MM3 


 


Basophils # (Auto) 0.1 TH/MM3  0.1 TH/MM3 


 


CBC Comment DIFF FINAL  DIFF FINAL 


 


Differential Comment    








Laboratory Tests








Test


  1/29/18


07:50


 


Blood Urea Nitrogen 7 MG/DL 


 


Creatinine 0.80 MG/DL 


 


Random Glucose 82 MG/DL 


 


Calcium Level 8.9 MG/DL 


 


Magnesium Level 2.0 MG/DL 


 


Sodium Level 138 MEQ/L 


 


Potassium Level 3.9 MEQ/L 


 


Chloride Level 106 MEQ/L 


 


Carbon Dioxide Level 26.4 MEQ/L 


 


Anion Gap 6 MEQ/L 


 


Estimat Glomerular Filtration


Rate 117 ML/MIN 


 








Microbiology








 Date/Time


Source Procedure


Growth Status


 


 


 1/29/18 13:50


Blood Peripheral Aerobic Blood Culture


Pending Received


 


 1/29/18 13:50


Blood Peripheral Anaerobic Blood Culture


Pending Received





 1/29/18 13:43


Blood Peripheral Aerobic Blood Culture


Pending Received


 


 1/29/18 13:43


Blood Peripheral Anaerobic Blood Culture


Pending Received


 


 1/28/18 08:50


Stool Stool - Final


NO ENTERIC PATHOGENS DETECTED BY PCR... Complete





 1/28/18 08:50


Stool Stool Cryptosporidium Exam - Final


NEGATIVE - NO CRYPTOSPORIDIUM ANTIGEN... Complete





 1/28/18 08:50


Stool Stool Stool Pus ([facility]) - Final


RARE WBC Complete


 


 1/28/18 08:50


Stool Stool Giardia Antigen ([facility]) - Final


NEGATIVE - NO GIARDIA ANTIGEN DETECTE... Complete








Imaging


 


Last Impressions








Lumbar Spine MRI 1/26/18 0000 Signed





Impressions: 





 Service Date/Time:  Friday, January 26, 2018 16:56 - CONCLUSION:  1. Very 

early 





 degenerative disc disease at the lumbosacral junction with mild disc 





 desiccation. 2. Otherwise negative. Disc and vertebral body heights are 





 maintained at all remaining lumbar levels. Spinal canal and neural foramina 

are 





 widely patent. 3. No abscess identified.     Adrian Harrison MD 


 


Hepatobiliary Scan Nuclear Medicine 1/26/18 0000 Signed





Impressions: 





 Service Date/Time:  Saturday, January 27, 2018 10:06 - CONCLUSION: Biliary 

scan 





 within normal limits.     Edgar Connell MD 


 


Gall Bladder Ultrasound 1/26/18 0000 Signed





Impressions: 





 Service Date/Time:  Friday, January 26, 2018 07:56 - CONCLUSION:  1. The 





 gallbladder wall is thickened and there is small volume pericholecystic fluid 





 but no stones, sludge, or distention of the gallbladder. The wall thickening 

and 





 pericholecystic fluid can relate to venous congestion as seen with aggressive 





 crystalloid therapy, right-sided heart failure, and hepatocellular disease. I 





 cannot completely exclude acalculous cholecystitis.     Mt Lew Jr., MD 


 


CT Angiography 1/26/18 0000 Signed





Impressions: 





 Service Date/Time:  Friday, January 26, 2018 23:09 - CONCLUSION:  No pulmonary 





 embolus.      Nahid Enamorado MD 


 


Abdomen/Pelvis CT 1/25/18 2001 Signed





Impressions: 





 Service Date/Time:  Thursday, January 25, 2018 21:34 - CONCLUSION:  1. No 





 evidence of bilateral Friday as are other acute renal abnormality. 2. 





 Hepatomegaly with periportal edema and also trace fluid around the 

gallbladder. 





 Please see above. No perceptible inflammatory changes. 3. Upper limits of 

normal 





 to mildly enlarged spleen, nonspecific. 4. Apparent patchy pneumonia in the 





 visualized right base. 5. Small fat containing umbilical hernia.     Nahid Cueva MD 








Physical Exam


 CONSTITUTIONAL/GENERAL: This is an adequately nourished patient, in no 

apparent distress.


TUBES/LINES/DRAINS:


SKIN: No jaundice, rashes, or lesions. + B/L a/c areas niddle marks. Skin 

temperature appropriate. Not diaphoretic. 


CARDIOVASCULAR: Regular rate and rhythm without murmurs, gallops, or rubs. No 

JVD. Peripheral pulses symmetric.


RESPIRATORY/CHEST: Symmetric, unlabored respirations. Clear to auscultation. 

Breath sounds equal bilaterally. No wheezes, rales, or rhonchi.  


GASTROINTESTINAL: Abdomen soft, non-tender, nondistended. No hepato-splenomegaly

, or palpable masses. No guarding. Bowel sounds present.


 MUSCULOSKELETAL: Extremities without clubbing, cyanosis, or edema. No joint 

tenderness or effusion noted. No calf tenderness. No mottling or clubbing.


Mild edema RUE cw IV infiltrateion


NEUROLOGICAL: Awake and alert. Motor and sensory grossly within normal limits. 

Follows commands. Clear speech


. Moves all extremities.


PSYCHIATRIC: No obvious anxiety/depression. no apparent hallucinations or other 

psychotic thought process.








Assessment & Plan


Remarks


New onset  lower back pain in seting of IVDU-er


   - No e/o  diskitis, osteomyelitis, or epuidural abscess on MRI 


Leukocytosis, bandemia - leukemoid reaction: resolved


Doubt cholecystitis


   - HIDA wnl 


Bactremia, different isolates. Doubt clinical significance 





 


   stop abx


   dc vanco 


   OK to dc prt home





Dw Janet Martinez MD Jan 29, 2018 15:53

## 2018-03-19 ENCOUNTER — HOSPITAL ENCOUNTER (EMERGENCY)
Dept: HOSPITAL 17 - NEPD | Age: 28
LOS: 1 days | Discharge: HOME | End: 2018-03-20
Payer: COMMERCIAL

## 2018-03-19 VITALS
RESPIRATION RATE: 18 BRPM | HEART RATE: 85 BPM | DIASTOLIC BLOOD PRESSURE: 98 MMHG | OXYGEN SATURATION: 98 % | SYSTOLIC BLOOD PRESSURE: 148 MMHG

## 2018-03-19 VITALS
DIASTOLIC BLOOD PRESSURE: 100 MMHG | RESPIRATION RATE: 20 BRPM | TEMPERATURE: 97.8 F | OXYGEN SATURATION: 98 % | SYSTOLIC BLOOD PRESSURE: 167 MMHG | HEART RATE: 100 BPM

## 2018-03-19 DIAGNOSIS — F31.9: ICD-10-CM

## 2018-03-19 DIAGNOSIS — J45.909: ICD-10-CM

## 2018-03-19 DIAGNOSIS — F90.9: ICD-10-CM

## 2018-03-19 DIAGNOSIS — F17.200: ICD-10-CM

## 2018-03-19 DIAGNOSIS — F11.10: Primary | ICD-10-CM

## 2018-03-19 PROCEDURE — 99283 EMERGENCY DEPT VISIT LOW MDM: CPT

## 2018-03-19 NOTE — PD
HPI


Chief Complaint:  Alcohol/Drug Intoxication


Time Seen by Provider:  19:37


Travel History


International Travel<30 days:  No


Contact w/Intl Traveler<30days:  No


Traveled to known affect area:  No





History of Present Illness


HPI


27-year-old male with history of IV heroin abuse presents under [place] act 

initiated by the Police Department.  The patient was seen running in the road 

several times and seemed to be pulling his pants down the road.  He admitted to 

the police to using heroin and thus he was brought here for evaluation.  He 

reports that he used some heroin today at a friend's house and then as he was 

walking home the police saw him and brought him here.  He says that he was not 

pulling his pants down he was trying to adjust his belt.  He says that he is 

hungry and thirsty because he has been working a lot and did not really eat 

much today.  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  Denies chest 

pain, shortness of breath, denies any injury, denies headache, abdominal pain, 

nausea or vomiting.





PFSH


Past Medical History


ADHD:  Yes


Arthritis:  No


Asthma:  Yes (CHILDHOOD)


Autoimmune Disease:  No


Bipolar Disorder:  Yes


Anxiety:  No


Depression:  No


Heart Rhythm Problems:  No


Cancer:  No


Cardiovascular Problems:  No


Chemotherapy:  No


Chest Pain:  No


Congestive Heart Failure:  No


COPD:  No


Cerebrovascular Accident:  No


Diabetes:  No


Diminished Hearing:  No


Endocrine:  No


GERD:  No


Genitourinary:  No


Hiatal Hernia:  No


Immune Disorder:  No


Kidney Stones:  No


Musculoskeletal:  No


Neurologic:  No


Psychiatric:  No


Reproductive:  No


Respiratory:  Yes (ASTHMA)


Immunizations Current:  Yes


Migraines:  No


Radiation Therapy:  No


Renal Failure:  No


Seizures:  No


Sickle Cell Disease:  No


Thyroid Disease:  No


Ulcer:  No


Pregnant?:  Not Pregnant





Past Surgical History


Abdominal Surgery:  No


AICD:  No


Cardiac Surgery:  No


Genitourinary Surgery:  No


Gynecologic Surgery:  No


Pacemaker:  No


Thoracic Surgery:  No





Social History


Alcohol Use:  Yes (OCCASIONALLY)


Tobacco Use:  Yes (1 ppd)


Substance Use:  Yes ( herion iv, occ. cocaine; DENIES ON THIS VISIT)





Allergies-Medications


(Allergen,Severity, Reaction):  


Coded Allergies:  


     No Known Allergies (Verified  Adverse Reaction, Unknown, 1/25/18)


Reported Meds & Prescriptions





Reported Meds & Active Scripts


Active


No Active Prescriptions or Reported Medications    








Review of Systems


Except as stated in HPI:  all other systems reviewed are Neg





Physical Exam


Narrative


GENERAL: Somewhat disheveled young male in no acute distress, acting little bit 

erratically initially but responds to questions and commands appropriately.


SKIN: Warm and dry.  Needle track marks noted in the left antecubital region.


HEAD: Atraumatic. Normocephalic. 


EYES: Pupils equal and round. No scleral icterus. No injection or drainage. 


ENT: No nasal bleeding or discharge.  Mucous membranes pink and moist.


NECK: Trachea midline. No JVD. 


CARDIOVASCULAR: Regular rate and rhythm.  No murmur appreciated.


RESPIRATORY: No accessory muscle use. Clear to auscultation. Breath sounds 

equal bilaterally. 


GASTROINTESTINAL: Abdomen soft, non-tender, nondistended. Hepatic and splenic 

margins not palpable. 


MUSCULOSKELETAL: No obvious deformities. No clubbing.  No cyanosis.  No edema. 


NEUROLOGICAL: Awake and alert. No obvious cranial nerve deficits.  Motor 

grossly within normal limits. Normal speech.





Data


Data


Last Documented VS





Vital Signs








  Date Time  Temp Pulse Resp B/P (MAP) Pulse Ox O2 Delivery O2 Flow Rate FiO2


 


3/20/18 00:07  68 18 131/76 (94) 100   


 


3/19/18 19:26 97.8       











[facility]


Medical Decision Making


Medical Screen Exam Complete:  Yes


Emergency Medical Condition:  Yes


Medical Record Reviewed:  Yes


Differential Diagnosis


Heroin abuse, substance-induced mood disorder, acute psychosis


Narrative Course


27-year-old male presents under [place] act after acting bizarrely shortly 

after using IV heroin.  The patient will be given enteral nourishment.  He will 

remain here until he is appropriate for discharge.





Diagnosis





 Primary Impression:  


 Heroin abuse


***Med/Other Pt SpecificInfo:  No Change to Meds


Scripts


No Active Prescriptions or Reported Meds


Disposition:  01 DISCHARGE HOME


Condition:  Stable











Chandu Yeager Mar 19, 2018 19:48

## 2018-03-20 VITALS
SYSTOLIC BLOOD PRESSURE: 131 MMHG | HEART RATE: 68 BPM | RESPIRATION RATE: 18 BRPM | OXYGEN SATURATION: 100 % | DIASTOLIC BLOOD PRESSURE: 76 MMHG